# Patient Record
Sex: MALE | Race: BLACK OR AFRICAN AMERICAN | Employment: OTHER | URBAN - METROPOLITAN AREA
[De-identification: names, ages, dates, MRNs, and addresses within clinical notes are randomized per-mention and may not be internally consistent; named-entity substitution may affect disease eponyms.]

---
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
## 2021-03-26 ENCOUNTER — HOSPITAL ENCOUNTER (EMERGENCY)
Age: 21
Discharge: HOME | End: 2021-03-26

## 2021-03-26 VITALS
WEIGHT: 215 LBS | HEART RATE: 59 BPM | SYSTOLIC BLOOD PRESSURE: 122 MMHG | BODY MASS INDEX: 34.55 KG/M2 | DIASTOLIC BLOOD PRESSURE: 77 MMHG | TEMPERATURE: 97.9 F | OXYGEN SATURATION: 99 % | HEIGHT: 66 IN | RESPIRATION RATE: 16 BRPM

## 2021-03-26 DIAGNOSIS — K14.8 TONGUE LESION: ICD-10-CM

## 2021-03-26 DIAGNOSIS — R09.81 NASAL CONGESTION: Primary | ICD-10-CM

## 2021-03-26 PROCEDURE — 99284 EMERGENCY DEPT VISIT MOD MDM: CPT

## 2021-03-26 RX ORDER — GUAIFENESIN, PSEUDOEPHEDRINE HYDROCHLORIDE 600; 60 MG/1; MG/1
1 TABLET, EXTENDED RELEASE ORAL EVERY 12 HOURS
Qty: 14 TABLET | Refills: 0 | Status: SHIPPED | OUTPATIENT
Start: 2021-03-26 | End: 2021-04-02

## 2021-03-26 ASSESSMENT — ENCOUNTER SYMPTOMS
ABDOMINAL PAIN: 0
SINUS PRESSURE: 0
COUGH: 0
VOICE CHANGE: 0
CONSTIPATION: 0
SHORTNESS OF BREATH: 0
SORE THROAT: 0
TROUBLE SWALLOWING: 0
NAUSEA: 0
SINUS PAIN: 0
FACIAL SWELLING: 0
DIARRHEA: 0
VOMITING: 0
RHINORRHEA: 0

## 2021-03-26 NOTE — ED PROVIDER NOTES
[address] [facility] ED  eMERGENCYdEPARTMENT eNCOUnter      Pt Name: Ortega Wallace  MRN: [id number]  [place] 2000of evaluation: 3/26/2021  Safia Bo PA-C    CHIEF COMPLAINT       Chief Complaint   Patient presents with    Nasal Congestion     pt c/o nasal congestion and spots on his tongue         HISTORY OF PRESENT ILLNESS  (Location/Symptom, Timing/Onset, Context/Setting, Quality, Duration, Modifying Factors, Severity.)   Ortega Wallace is a 22 y.o. male who presents to the emergency department with 1) nasal congestion for a few days. Patient denies shortness of breath, fever, headache, ear pain, sore throat, abdominal pain, nausea, vomiting, diarrhea loss of taste, loss of smell. No one is sick at home. Patient reports having a Covid 19 test last week (negative)  2) darkened spots on his tongue for a few weeks. Patient denies painful lesions. Patient denies noticing these spots on his tongue prior to a few weeks ago. Patient is a non-smoker does not use tobacco products. Patient does smoke occasional THC. Patient notes no difficulty swallowing or breathing. He denies taking any products like Pepto-Bismol or medications for any reflux. Pepcid was listed as medication for this patient but he states he has not taken this medication for over a year. No history of cold sores. The history is provided by the patient. Nursing Notes were reviewed and I agree. REVIEW OF SYSTEMS    (2-9 systems for level 4, 10 or more for level 5)     Review of Systems   Constitutional: Negative for chills and fever. HENT: Positive for congestion. Negative for drooling, ear pain, facial swelling, rhinorrhea, sinus pressure, sinus pain, sneezing, sore throat, tinnitus, trouble swallowing and voice change. Respiratory: Negative for cough and shortness of breath. Gastrointestinal: Negative for abdominal pain, constipation, diarrhea, nausea and vomiting. Skin: Negative for rash. Neurological: Negative for headaches. as noted above the remainder of the review of systems was reviewed and negative. PAST MEDICAL HISTORY   History reviewed. No pertinent past medical history. SURGICAL HISTORY     History reviewed. No pertinent surgical history. CURRENT MEDICATIONS       Discharge Medication List as of 3/26/2021 10:09 AM      CONTINUE these medications which have NOT CHANGED    Details   naproxen (NAPROSYN) 500 MG tablet Take 1 tablet by mouth 2 times daily for 20 doses, Disp-20 tablet, R-0Print             ALLERGIES     Patient has no known allergies. HISTORY     History reviewed. No pertinent family history.        SOCIAL HISTORY       Social History     Socioeconomic History    Marital status: Single     Spouse name: None    Number of children: None    Years of education: None    Highest education level: None   Occupational History    None   Social Needs    Financial resource strain: None    Food insecurity     Worry: None     Inability: None    Transportation needs     Medical: None     Non-medical: None   Tobacco Use    Smoking status: Current Some Day Smoker     Types: Cigars    Smokeless tobacco: Never Used   Substance and Sexual Activity    Alcohol use: No    Drug use: Yes     Types: Marijuana    Sexual activity: None   Lifestyle    Physical activity     Days per week: None     Minutes per session: None    Stress: None   Relationships    Social connections     Talks on phone: None     Gets together: None     Attends Yazidism service: None     Active member of club or organization: None     Attends meetings of clubs or organizations: None     Relationship status: None    Intimate partner violence     Fear of current or ex partner: None     Emotionally abused: None     Physically abused: None     Forced sexual activity: None   Other Topics Concern    None   Social History Narrative    None       SCREENINGS    Dante Coma Scale  Eye Opening: Spontaneous  Best Verbal Response: Oriented  Best Motor Response: Obeys commands  [place] Coma Scale Score: 15      PHYSICAL EXAM    (up to 7 forlevel 4, 8 or more for level 5)     ED Triage Vitals [03/26/21 0946]   BP Temp Temp Source Pulse Resp SpO2 Height Weight   122/77 97.9 °F (36.6 °C) Oral 59 16 99 % 5' 6\" (1.676 m) 215 lb (97.5 kg)       Physical Exam  Vitals signs and nursing note reviewed. Constitutional:       General: He is not in acute distress. Appearance: He is well-developed. He is not toxic-appearing. HENT:      Head: Normocephalic and atraumatic. Right Ear: Tympanic membrane, ear canal and external ear normal.      Left Ear: Tympanic membrane, ear canal and external ear normal.      Nose: Congestion (Left-sided, mild) present. Right Sinus: No maxillary sinus tenderness or frontal sinus tenderness. Left Sinus: No maxillary sinus tenderness or frontal sinus tenderness. Mouth/Throat:      Lips: Pink. No lesions. Mouth: Mucous membranes are moist. No oral lesions. Dentition: No gum lesions. Tongue: Lesions present. Palate: No mass and lesions. Pharynx: Uvula midline. No pharyngeal swelling, oropharyngeal exudate, posterior oropharyngeal erythema or uvula swelling. Tonsils: No tonsillar exudate or tonsillar abscesses. Comments: Multiple hyperpigmented freckles on lower lip and inner aspects of lower lip and gums. (Patient reports these to be baseline from birth) No other intraoral lesions noted. Eyes:      Conjunctiva/sclera: Conjunctivae normal.      Pupils: Pupils are equal, round, and reactive to light. Neck:      Musculoskeletal: Normal range of motion and neck supple. Thyroid: No thyromegaly. Trachea: Trachea and phonation normal.   Cardiovascular:      Rate and Rhythm: Normal rate and regular rhythm. Heart sounds: Normal heart sounds. No murmur.    Pulmonary:      Effort: Pulmonary effort is normal. No respiratory distress. Breath sounds: Normal breath sounds. No wheezing, rhonchi or rales. Lymphadenopathy:      Head:      Right side of head: No submental, submandibular or tonsillar adenopathy. Left side of head: No submental, submandibular or tonsillar adenopathy. Cervical: No cervical adenopathy. Skin:     General: Skin is warm and dry. Findings: No rash. Neurological:      Mental Status: He is alert and oriented to person, place, and time. He is not disoriented. Psychiatric:         Speech: Speech normal.         Behavior: Behavior normal.         Thought Content: Thought content normal.         Judgment: Judgment normal.           DIAGNOSTIC RESULTS     RADIOLOGY:   Non-plain film images such as CT, Ultrasound and MRI are read by the radiologist. Plain radiographic images are visualized and preliminarilyinterpreted by Naomi Pagan PA-C with the below findings:        Interpretation per the Radiologist below, if available at the time of this note:    No orders to display       LABS:  Labs Reviewed - No data to display    All other labs were within normal range or not returnedas of this dictation. EMERGENCYDEPARTMENT COURSE and DIFFERENTIAL DIAGNOSIS/MDM:   Vitals:    Vitals:    03/26/21 0946   BP: 122/77   Pulse: 59   Resp: 16   Temp: 97.9 °F (36.6 °C)   TempSrc: Oral   SpO2: 99%   Weight: 215 lb (97.5 kg)   Height: 5' 6\" (1.676 m)           MDM    1) mild left-sided head congestion: Patient counseled that his infection appears to be of a viral etiology at this time. Symptomatic treatments discussed. Patient counseled to return here or go to the Emergency department if his symptoms change, worsen or do not improve in 1 week. I offered a COVID-19 swab but patient declined stating he had a negative test last week. 2) tongue lesions: Patient reports newly developing hyperpigmented tongue lesions x3 on his anterior supraglottal region of tongue.   He denies use of products such as Pepto that might cause discoloration. These lesions are neither tender nor friable. He has similar appearing hyperpigmented freckle spots on his lower inner lip and gums, but states the tongue spots are new. For this reason, cancerous lesions cannot fully be excluded so I will refer him to an ear nose and throat specialist for further evaluation. Additionally I counseled him to stop THC. PROCEDURES:    Procedures      FINAL IMPRESSION      1. Nasal congestion    2.  Tongue lesion          DISPOSITION/PLAN   DISPOSITION Decision To Discharge 03/26/2021 10:02:36 AM      PATIENT REFERRED TO:  Ana Rosa Wing,   [address] [facility]  [address]  [address] [facility] [phone]      As needed    Patricia Pastrana MD  [address] [place] [address] [place] [address]  [phone]            DISCHARGE MEDICATIONS:  Discharge Medication List as of 3/26/2021 10:09 AM      START taking these medications    Details   pseudoephedrine-guaiFENesin (MUCINEX D)  MG per extended release tablet Take 1 tablet by mouth every 12 hours for 7 days, Disp-14 tablet, R-0Print             (Please note thatportions of this note were completed with a voice recognition program.  Efforts were made to edit the dictations but occasionally words are mis-transcribed.)    Romualdo Curling, PA-C Romualdo Curling, PA-C  03/26/21 [id number]

## 2021-03-26 NOTE — ED TRIAGE NOTES
Pt c/o nasal congestion and three spots on his tongue, Pt is A&OX3, calm, ambulatory, afebrile, breathes are equal and unlabored, lung sounds clear, Pt has three dark areas on his tongue, no redness or edema noted.

## 2021-06-16 ENCOUNTER — HOSPITAL ENCOUNTER (EMERGENCY)
Age: 21
Discharge: HOME | End: 2021-06-16

## 2021-06-16 VITALS
HEART RATE: 62 BPM | SYSTOLIC BLOOD PRESSURE: 107 MMHG | WEIGHT: 220 LBS | OXYGEN SATURATION: 100 % | TEMPERATURE: 98 F | RESPIRATION RATE: 16 BRPM | DIASTOLIC BLOOD PRESSURE: 60 MMHG | HEIGHT: 66 IN | BODY MASS INDEX: 35.36 KG/M2

## 2021-06-16 DIAGNOSIS — Z71.1 CONCERN ABOUT STD IN MALE WITHOUT DIAGNOSIS: Primary | ICD-10-CM

## 2021-06-16 LAB
BILIRUBIN URINE: NEGATIVE
BLOOD, URINE: NEGATIVE
CLARITY: ABNORMAL
COLOR: YELLOW
GLUCOSE URINE: NEGATIVE MG/DL
KETONES, URINE: ABNORMAL MG/DL
LEUKOCYTE ESTERASE, URINE: NEGATIVE
NITRITE, URINE: NEGATIVE
PH UA: 8 (ref 5–9)
PROTEIN UA: NEGATIVE MG/DL
SPECIFIC GRAVITY UA: 1.02 (ref 1–1.03)
URINE REFLEX TO CULTURE: ABNORMAL
UROBILINOGEN, URINE: 1 E.U./DL

## 2021-06-16 PROCEDURE — 81003 URINALYSIS AUTO W/O SCOPE: CPT

## 2021-06-16 PROCEDURE — 87491 CHLMYD TRACH DNA AMP PROBE: CPT

## 2021-06-16 PROCEDURE — 6360000002 HC RX W HCPCS: Performed by: NURSE PRACTITIONER

## 2021-06-16 PROCEDURE — 6370000000 HC RX 637 (ALT 250 FOR IP): Performed by: NURSE PRACTITIONER

## 2021-06-16 PROCEDURE — 87591 N.GONORRHOEAE DNA AMP PROB: CPT

## 2021-06-16 PROCEDURE — 87661 TRICHOMONAS VAGINALIS AMPLIF: CPT

## 2021-06-16 PROCEDURE — 96372 THER/PROPH/DIAG INJ SC/IM: CPT

## 2021-06-16 PROCEDURE — 99283 EMERGENCY DEPT VISIT LOW MDM: CPT

## 2021-06-16 RX ORDER — AZITHROMYCIN 250 MG/1
1000 TABLET, FILM COATED ORAL ONCE
Status: COMPLETED | OUTPATIENT
Start: 2021-06-16 | End: 2021-06-16

## 2021-06-16 RX ORDER — CEFTRIAXONE 500 MG/1
500 INJECTION, POWDER, FOR SOLUTION INTRAMUSCULAR; INTRAVENOUS ONCE
Status: COMPLETED | OUTPATIENT
Start: 2021-06-16 | End: 2021-06-16

## 2021-06-16 RX ORDER — METRONIDAZOLE 500 MG/1
2000 TABLET ORAL ONCE
Status: COMPLETED | OUTPATIENT
Start: 2021-06-16 | End: 2021-06-16

## 2021-06-16 RX ADMIN — CEFTRIAXONE SODIUM 500 MG: 500 INJECTION, POWDER, FOR SOLUTION INTRAMUSCULAR; INTRAVENOUS at 09:52

## 2021-06-16 RX ADMIN — AZITHROMYCIN MONOHYDRATE 1000 MG: 250 TABLET ORAL at 09:51

## 2021-06-16 RX ADMIN — METRONIDAZOLE 2000 MG: 500 TABLET ORAL at 09:50

## 2021-06-16 ASSESSMENT — ENCOUNTER SYMPTOMS
ABDOMINAL PAIN: 0
COUGH: 0
SHORTNESS OF BREATH: 0
BACK PAIN: 0

## 2021-06-16 NOTE — ED PROVIDER NOTES
[address] [facility] ED  eMERGENCY dEPARTMENT eNCOUnter      Pt Name: Eliazar Stein  MRN: [id number]  [place] 2000  Date of evaluation: 6/16/2021  Provider: ANURAG Zavala CNP      HISTORY OF PRESENT ILLNESS    Eliazar Stein is a 22 y.o. male who presents to the Emergency Department with dysuria and frequency x 2 days. Patient is sexually active. He denies abdominal pain, nausea or vomiting. Penile D/C or testicular pain. No pain. REVIEW OF SYSTEMS       Review of Systems   Constitutional: Negative for fever. HENT: Negative for congestion. Respiratory: Negative for cough and shortness of breath. Cardiovascular: Negative for chest pain. Gastrointestinal: Negative for abdominal pain. Genitourinary: Positive for dysuria and frequency. Negative for difficulty urinating, discharge, flank pain, genital sores, hematuria, penile pain, penile swelling, scrotal swelling, testicular pain and urgency. Musculoskeletal: Negative for arthralgias and back pain. Skin: Negative for rash. All other systems reviewed and are negative. PAST MEDICAL HISTORY   History reviewed. No pertinent past medical history. SURGICAL HISTORY     History reviewed. No pertinent surgical history. CURRENT MEDICATIONS       Previous Medications    NAPROXEN (NAPROSYN) 500 MG TABLET    Take 1 tablet by mouth 2 times daily for 20 doses       ALLERGIES     Patient has no known allergies. FAMILY HISTORY     History reviewed. No pertinent family history.        SOCIAL HISTORY       Social History     Socioeconomic History    Marital status: Single     Spouse name: None    Number of children: None    Years of education: None    Highest education level: None   Occupational History    None   Tobacco Use    Smoking status: Current Some Day Smoker     Types: Cigars    Smokeless tobacco: Never Used   Vaping Use    Vaping Use: Never used   Substance and Sexual Activity    Alcohol use: No    Drug use: Yes     Types: Marijuana    Sexual activity: None   Other Topics Concern    None   Social History Narrative    None     Social Determinants of Health     Financial Resource Strain:     Difficulty of Paying Living Expenses:    Food Insecurity:     Worried About Running Out of Food in the Last Year:     [address] in the Last Year:    Transportation Needs:     Lack of Transportation (Medical):  Lack of Transportation (Non-Medical):    Physical Activity:     Days of Exercise per Week:     Minutes of Exercise per Session:    Stress:     Feeling of Stress :    Social Connections:     Frequency of Communication with Friends and Family:     Frequency of Social Gatherings with Friends and Family:     Attends Jewish Services:     Active Member of Clubs or Organizations:     Attends Club or Organization Meetings:     Marital Status:    Intimate Partner Violence:     Fear of Current or Ex-Partner:     Emotionally Abused:     Physically Abused:     Sexually Abused:        SCREENINGS      @FLOW([id number])@      PHYSICAL EXAM    (up to 7 for level 4, 8 or more for level 5)     ED Triage Vitals   BP Temp Temp Source Pulse Resp SpO2 Height Weight   06/16/21 0906 06/16/21 0905 06/16/21 0905 06/16/21 0906 06/16/21 0905 06/16/21 0906 06/16/21 0905 06/16/21 0905   107/60 98 °F (36.7 °C) Oral 62 16 100 % 5' 6\" (1.676 m) 220 lb (99.8 kg)       Physical Exam  Vitals and nursing note reviewed. Constitutional:       Appearance: He is well-developed. HENT:      Head: Normocephalic and atraumatic. Right Ear: External ear normal.      Left Ear: External ear normal.   Eyes:      Conjunctiva/sclera: Conjunctivae normal.      Pupils: Pupils are equal, round, and reactive to light. Cardiovascular:      Rate and Rhythm: Normal rate and regular rhythm. Pulmonary:      Effort: Pulmonary effort is normal.      Breath sounds: Normal breath sounds.    Abdominal:      General: Bowel sounds are normal. There is no distension. Palpations: Abdomen is soft. Tenderness: There is no abdominal tenderness. Genitourinary:      Musculoskeletal:         General: Normal range of motion. Cervical back: Normal range of motion and neck supple. Skin:     General: Skin is warm and dry. Neurological:      Mental Status: He is alert and oriented to person, place, and time. Deep Tendon Reflexes: Reflexes are normal and symmetric. Psychiatric:         Judgment: Judgment normal.           All other labs were within normal range or not returned as of this dictation. EMERGENCY DEPARTMENT COURSE and DIFFERENTIALDIAGNOSIS/MDM:   Vitals:    Vitals:    06/16/21 0905 06/16/21 0906   BP:  107/60   Pulse:  62   Resp: 16    Temp: 98 °F (36.7 °C)    TempSrc: Oral    SpO2:  100%   Weight: 220 lb (99.8 kg)    Height: 5' 6\" (1.676 m)             22 yr old male with concern for STD. Patient will F/U With PCP in 3 days for culture results. UA was negative. Patient is comfortable in the room and verbalizes understanding. PROCEDURES:  Unless otherwise noted below, none     Procedures      FINAL IMPRESSION      1.  Concern about STD in male without diagnosis          DISPOSITION/PLAN   DISPOSITION Decision To Discharge 06/16/2021 10:24:51 AM          ANURAG Braun CNP (electronically signed)  Attending Emergency Physician     ANURAG Braun CNP  06/16/21 1026

## 2021-06-16 NOTE — ED TRIAGE NOTES
Patient presents to the er with complaints of frequent urination  States that when he feels like he has to go pee, he starts to and a little comes out, then 10 mins later he has to pee again  Some irritation with urinating  Denies penile discharge  States urine is dark yellow

## 2021-06-18 LAB
SPECIMEN SOURCE: NORMAL
T. VAGINALIS AMPLIFIED: NEGATIVE

## 2021-06-22 LAB
C. TRACHOMATIS DNA ,URINE: NEGATIVE
N. GONORRHOEAE DNA, URINE: NEGATIVE

## 2021-11-11 ENCOUNTER — HOSPITAL ENCOUNTER (EMERGENCY)
Age: 21
Discharge: HOME | End: 2021-11-11
Attending: EMERGENCY MEDICINE
Payer: MEDICAID

## 2021-11-11 DIAGNOSIS — J01.00 ACUTE NON-RECURRENT MAXILLARY SINUSITIS: Primary | ICD-10-CM

## 2021-11-11 PROCEDURE — 99282 EMERGENCY DEPT VISIT SF MDM: CPT

## 2021-11-11 RX ORDER — AZITHROMYCIN 250 MG/1
TABLET, FILM COATED ORAL
Qty: 1 PACKET | Refills: 0 | Status: SHIPPED | OUTPATIENT
Start: 2021-11-11 | End: 2021-11-15

## 2021-11-11 ASSESSMENT — ENCOUNTER SYMPTOMS
SHORTNESS OF BREATH: 0
BACK PAIN: 0
ABDOMINAL PAIN: 0
SORE THROAT: 0
NAUSEA: 0
RHINORRHEA: 1
SINUS PRESSURE: 1
COUGH: 1
DIARRHEA: 0
VOMITING: 0

## 2021-11-11 NOTE — ED PROVIDER NOTES
[address] [facility] ED  eMERGENCYdEPARTMENT eNCOUnter      Pt Name: Alfred Alejo  MRN: [id number]  [place]aud[place] 2000  Date of evaluation: 11/11/2021  Maldonado Chirinos MD    CHIEF COMPLAINT           HPI  Alfred Alejo is a 22 y.o. male per chart review has no pmh presents to the ED with rhinorrhea, sinus congestion x 10 days. Pt states he woke up today and coughed up blood. Pt states it feels the blood came from his sinuses. Pt denies fever, n/v, cp, sob, ab pain, dysuria, diarrhea. ROS  Review of Systems   Constitutional: Negative for activity change, chills and fever. HENT: Positive for rhinorrhea and sinus pressure. Negative for ear pain and sore throat. Eyes: Negative for visual disturbance. Respiratory: Positive for cough. Negative for shortness of breath. Hemoptysis   Cardiovascular: Negative for chest pain, palpitations and leg swelling. Gastrointestinal: Negative for abdominal pain, diarrhea, nausea and vomiting. Genitourinary: Negative for dysuria. Musculoskeletal: Negative for back pain. Skin: Negative for rash. Neurological: Negative for dizziness and weakness. Except as noted above the remainder of the review of systems was reviewed and negative. PAST MEDICAL HISTORY   No past medical history on file. SURGICAL HISTORY     No past surgical history on file. CURRENTMEDICATIONS       Previous Medications    NAPROXEN (NAPROSYN) 500 MG TABLET    Take 1 tablet by mouth 2 times daily for 20 doses       ALLERGIES     Patient has no known allergies. FAMILY HISTORY     No family history on file.        SOCIAL HISTORY       Social History     Socioeconomic History    Marital status: Single     Spouse name: Not on file    Number of children: Not on file    Years of education: Not on file    Highest education level: Not on file   Occupational History    Not on file   Tobacco Use    Smoking status: Current Some Day Smoker     Types: Cigars    Smokeless tobacco: Never Used   Vaping Use    Vaping Use: Never used   Substance and Sexual Activity    Alcohol use: No    Drug use: Yes     Types: Marijuana Garo Mustard)    Sexual activity: Not on file   Other Topics Concern    Not on file   Social History Narrative    Not on file     Social Determinants of Health     Financial Resource Strain:     Difficulty of Paying Living Expenses: Not on file   Food Insecurity:     Worried About Running Out of Food in the Last Year: Not on file    [place] of Food in the Last Year: Not on file   Transportation Needs:     Lack of Transportation (Medical): Not on file    Lack of Transportation (Non-Medical): Not on file   Physical Activity:     Days of Exercise per Week: Not on file    Minutes of Exercise per Session: Not on file   Stress:     Feeling of Stress : Not on file   Social Connections:     Frequency of Communication with Friends and Family: Not on file    Frequency of Social Gatherings with Friends and Family: Not on file    Attends [employer] Services: Not on file    Active Member of [address] [place] [address] [employer] or Organizations: Not on file    Attends Club or Organization Meetings: Not on file    Marital Status: Not on file   Intimate Partner Violence:     Fear of Current or Ex-Partner: Not on file    Emotionally Abused: Not on file    Physically Abused: Not on file    Sexually Abused: Not on file   Housing Stability:     Unable to Pay for Housing in the Last Year: Not on file    Number of [place] in the Last Year: Not on file    Unstable Housing in the Last Year: Not on file         PHYSICAL EXAM       ED Triage Vitals   BP Temp Temp src Pulse Resp SpO2 Height Weight   -- -- -- -- -- -- -- --       Physical Exam  Vitals and nursing note reviewed. Constitutional:       Appearance: He is well-developed. HENT:      Head: Normocephalic.       Comments: +Maxillary sinus pressure     Right Ear: External ear normal.      Left Ear: External ear normal.      Ears: Comments: L TM opaque, poor landmarks. R TM opaque, poor landmarks. Eyes:      Conjunctiva/sclera: Conjunctivae normal.      Pupils: Pupils are equal, round, and reactive to light. Cardiovascular:      Rate and Rhythm: Normal rate and regular rhythm. Heart sounds: Normal heart sounds. Pulmonary:      Effort: Pulmonary effort is normal.      Breath sounds: Normal breath sounds. Abdominal:      General: Bowel sounds are normal. There is no distension. Palpations: Abdomen is soft. Tenderness: There is no abdominal tenderness. Musculoskeletal:         General: Normal range of motion. Cervical back: Normal range of motion and neck supple. Skin:     General: Skin is warm and dry. Neurological:      Mental Status: He is alert and oriented to person, place, and time. Psychiatric:         Mood and Affect: Mood normal.           MDM  23 yo male presents to the ED with rhinorrhea, cough, sinus pressure. Pt is afebrile, hemodynamically stable. Pt states he coughed up blood but feels like it came from his sinuses. Pt likely with sinusitis. Given longevity of symptoms, will treat pt with abx. Pt given prescription for azithromycin, given sinusitis warning signs and will f/u with pcp. Pt understands plan. FINAL IMPRESSION      1.  Acute non-recurrent maxillary sinusitis          DISPOSITION/PLAN   DISPOSITION          DISCHARGE MEDICATIONS:  [unfilled]         Carlos Lennon MD(electronically signed)  Attending Emergency Physician            Carlos Lennon MD  11/11/21 8190

## 2021-11-11 NOTE — ED TRIAGE NOTES
Pt a/o x 3 skin pink w/d resp non labored. Pt c/o coughijng up blood.  Assumed d/c of pt per Dr Ml Nuno request

## 2021-11-13 PROCEDURE — 99284 EMERGENCY DEPT VISIT MOD MDM: CPT

## 2021-11-14 ENCOUNTER — HOSPITAL ENCOUNTER (EMERGENCY)
Age: 21
Discharge: HOME | End: 2021-11-14
Attending: EMERGENCY MEDICINE
Payer: MEDICAID

## 2021-11-14 ENCOUNTER — APPOINTMENT (OUTPATIENT)
Dept: GENERAL RADIOLOGY | Age: 21
End: 2021-11-14
Payer: MEDICAID

## 2021-11-14 VITALS
WEIGHT: 220 LBS | HEIGHT: 65 IN | RESPIRATION RATE: 18 BRPM | SYSTOLIC BLOOD PRESSURE: 111 MMHG | DIASTOLIC BLOOD PRESSURE: 78 MMHG | OXYGEN SATURATION: 99 % | HEART RATE: 95 BPM | BODY MASS INDEX: 36.65 KG/M2 | TEMPERATURE: 98.2 F

## 2021-11-14 DIAGNOSIS — R11.0 NAUSEA: Primary | ICD-10-CM

## 2021-11-14 PROCEDURE — 71045 X-RAY EXAM CHEST 1 VIEW: CPT

## 2021-11-14 PROCEDURE — 6370000000 HC RX 637 (ALT 250 FOR IP): Performed by: EMERGENCY MEDICINE

## 2021-11-14 RX ORDER — ONDANSETRON 4 MG/1
4 TABLET, ORALLY DISINTEGRATING ORAL ONCE
Status: COMPLETED | OUTPATIENT
Start: 2021-11-14 | End: 2021-11-14

## 2021-11-14 RX ORDER — ONDANSETRON 4 MG/1
4 TABLET, ORALLY DISINTEGRATING ORAL 3 TIMES DAILY PRN
Qty: 21 TABLET | Refills: 0 | Status: ON HOLD | OUTPATIENT
Start: 2021-11-14 | End: 2021-11-19

## 2021-11-14 RX ADMIN — ONDANSETRON 4 MG: 4 TABLET, ORALLY DISINTEGRATING ORAL at 01:58

## 2021-11-14 ASSESSMENT — ENCOUNTER SYMPTOMS
NAUSEA: 1
COUGH: 1

## 2021-11-14 NOTE — ED NOTES
Patient appears more comfortable, reports improvement in nausea, tolerated apple juice, now drinking ginger ale and eating pudding, will prepare for discharge     Nubia Pinto RN  11/14/21 6666

## 2021-11-14 NOTE — ED PROVIDER NOTES
[address] [facility] ED  EMERGENCY MEDICINE     Pt Name: Mejia Velasquez  MRN: [id number]  [place] 2000  Date of evaluation: 11/13/2021  PCP:    Pina Santana DO  Provider: Betzaida Lyon, [address] [place] [address]       Chief Complaint   Patient presents with    Dehydration     mouth is dry       HISTORY OF PRESENT ILLNESS    HPI     23-year-old male no past medical history presents to the emergency department with nausea. He states that he has had a cough for the past week and was seen here a few days ago and was given azithromycin for possible sinusitis. He states that the medicine is upsetting his stomach. States that he is coughing so hard that he is vomiting. Denies any abdominal pain or shortness of breath. Denies any fevers or chills. Triage notes and Nursing notes were reviewed by myself. Any discrepancies are addressed above. PAST MEDICAL HISTORY   History reviewed. No pertinent past medical history. SURGICAL HISTORY     History reviewed. No pertinent surgical history. CURRENT MEDICATIONS       Previous Medications    AZITHROMYCIN (ZITHROMAX Z-PHAN) 250 MG TABLET    Take 2 tablets (500 mg) on Day 1, and then take 1 tablet (250 mg) on days 2 through 5. NAPROXEN (NAPROSYN) 500 MG TABLET    Take 1 tablet by mouth 2 times daily for 20 doses       ALLERGIES     No Known Allergies    FAMILY HISTORY     History reviewed. No pertinent family history.      SOCIAL HISTORY       Social History     Socioeconomic History    Marital status: Single     Spouse name: None    Number of children: None    Years of education: None    Highest education level: None   Occupational History    None   Tobacco Use    Smoking status: Current Some Day Smoker     Types: Cigars    Smokeless tobacco: Never Used   Vaping Use    Vaping Use: Never used   Substance and Sexual Activity    Alcohol use: No    Drug use: Yes     Types: Marijuana Kenji Duglas)    Sexual activity: None   Other Topics Concern    None Social History Narrative    None     Social Determinants of Health     Financial Resource Strain:     Difficulty of Paying Living Expenses: Not on file   Food Insecurity:     Worried About Running Out of Food in the Last Year: Not on file    [place] of Food in the Last Year: Not on file   Transportation Needs:     Lack of Transportation (Medical): Not on file    Lack of Transportation (Non-Medical): Not on file   Physical Activity:     Days of Exercise per Week: Not on file    Minutes of Exercise per Session: Not on file   Stress:     Feeling of Stress : Not on file   Social Connections:     Frequency of Communication with Friends and Family: Not on file    Frequency of Social Gatherings with Friends and Family: Not on file    Attends [employer] Services: Not on file    Active Member of [address] [place] [address] [employer] or Organizations: Not on file    Attends Club or Organization Meetings: Not on file    Marital Status: Not on file   Intimate Partner Violence:     Fear of Current or Ex-Partner: Not on file    Emotionally Abused: Not on file    Physically Abused: Not on file    Sexually Abused: Not on file   Housing Stability:     Unable to Pay for Housing in the Last Year: Not on file    Number of [place] in the Last Year: Not on file    Unstable Housing in the Last Year: Not on file       REVIEW OF SYSTEMS     Review of Systems   Respiratory: Positive for cough. Gastrointestinal: Positive for nausea. Except as noted above the remainder of the review of systems was reviewed and is negative. SCREENINGS                        PHYSICAL EXAM    (up to 7 for level 4, 8 or more for level 5)     ED Triage Vitals [11/13/21 2159]   BP Temp Temp Source Pulse Resp SpO2 Height Weight   103/67 98.2 °F (36.8 °C) Oral 105 18 98 % 5' 5\" (1.651 m) 220 lb (99.8 kg)       Physical Exam  Constitutional:       Appearance: Normal appearance. He is normal weight. He is not ill-appearing or toxic-appearing.    HENT:      Head: Normocephalic and atraumatic. Nose: Nose normal. No congestion or rhinorrhea. Mouth/Throat:      Mouth: Mucous membranes are moist.   Eyes:      General:         Right eye: No discharge. Left eye: No discharge. Conjunctiva/sclera: Conjunctivae normal.      Pupils: Pupils are equal, round, and reactive to light. Cardiovascular:      Rate and Rhythm: Normal rate. Heart sounds: No murmur heard. Pulmonary:      Effort: Pulmonary effort is normal. No respiratory distress. Breath sounds: Normal breath sounds. No wheezing. Chest:      Chest wall: No tenderness. Abdominal:      General: Abdomen is flat. There is no distension. Palpations: Abdomen is soft. Tenderness: There is no abdominal tenderness. Musculoskeletal:         General: No swelling. Normal range of motion. Cervical back: Normal range of motion and neck supple. Skin:     General: Skin is warm and dry. Capillary Refill: Capillary refill takes less than 2 seconds. Neurological:      General: No focal deficit present. Mental Status: He is alert and oriented to person, place, and time. Psychiatric:         Mood and Affect: Mood normal.         Behavior: Behavior normal.         Thought Content: Thought content normal.         Judgment: Judgment normal.           DIAGNOSTIC RESULTS     EKG:(none if blank)  All EKGs are interpreted by the Emergency Department Physician who either signs or Co-signs this chart in the absence of a cardiologist.        RADIOLOGY: (none if blank)   I directly visualized the following images and reviewed the radiologist interpretations. Interpretation per the Radiologist below, if available at the time of this note:  XR CHEST PORTABLE    (Results Pending)       LABS:  Labs Reviewed - No data to display    All other labs were within normal range or not returned as of this dictation.   Please note, any cultures that may have been sent were not resulted at the time of this patient visit. EMERGENCY DEPARTMENT COURSE and Medical Decision Making:     Vitals:    Vitals:    11/13/21 2159   BP: 103/67   Pulse: 105   Resp: 18   Temp: 98.2 °F (36.8 °C)   TempSrc: Oral   SpO2: 98%   Weight: 220 lb (99.8 kg)   Height: 5' 5\" (1.651 m)       PROCEDURES: (None if blank)  Procedures       MDM    Patient given Zofran. Was able to tolerate p.o. challenge with apple juice. Was told to stop the azithromycin as this may be upsetting his stomach. Was told to stay on a simple diet until he does start feeling better. Will be discharged in stable condition. Patient has moist mucous membranes and normal vitals and does not appear to be dehydrated. Strict return precautions and follow up instructions were discussed with the patient with which the patient agrees    ED Medications administered this visit:    Medications   ondansetron (ZOFRAN-ODT) disintegrating tablet 4 mg (4 mg Oral Given 11/14/21 0158)         FINAL IMPRESSION      1.  Nausea          DISPOSITION/PLAN   DISPOSITION Decision To Discharge 11/14/2021 02:54:45 AM      PATIENT REFERRED TO:  Maya Hernandez DO  [address] [place] [address]  [phone] [address]  [phone]            DISCHARGE MEDICATIONS:  New Prescriptions    ONDANSETRON (ZOFRAN-ODT) 4 MG DISINTEGRATING TABLET    Take 1 tablet by mouth 3 times daily as needed for Nausea or Vomiting              Jose R Nina DO (electronically signed)  Attending Physician, Emergency Department         Jose R Nina [address] [place] [address]  11/14/21 5239

## 2021-11-14 NOTE — ED TRIAGE NOTES
Pt a/o x 3 skin pink w/d resp non labored. Pt reports he thinks he is dehydrated and his mouth is dry. Pt seen here 4-5 days ago dx sinus infection. lungs clear

## 2021-11-19 ENCOUNTER — HOSPITAL ENCOUNTER (INPATIENT)
Age: 21
LOS: 3 days | Discharge: HOME | DRG: 137 | End: 2021-11-22
Attending: EMERGENCY MEDICINE | Admitting: INTERNAL MEDICINE
Payer: MEDICAID

## 2021-11-19 ENCOUNTER — APPOINTMENT (OUTPATIENT)
Dept: CT IMAGING | Age: 21
DRG: 137 | End: 2021-11-19
Payer: MEDICAID

## 2021-11-19 DIAGNOSIS — J18.9 PNEUMONIA DUE TO INFECTIOUS ORGANISM, UNSPECIFIED LATERALITY, UNSPECIFIED PART OF LUNG: Primary | ICD-10-CM

## 2021-11-19 DIAGNOSIS — I26.99 ACUTE PULMONARY EMBOLISM, UNSPECIFIED PULMONARY EMBOLISM TYPE, UNSPECIFIED WHETHER ACUTE COR PULMONALE PRESENT (HCC): ICD-10-CM

## 2021-11-19 LAB
ADENOVIRUS BY PCR: NOT DETECTED
ALBUMIN SERPL-MCNC: 4 G/DL (ref 3.5–4.6)
ALP BLD-CCNC: 61 U/L (ref 35–104)
ALT SERPL-CCNC: 39 U/L (ref 0–41)
ANION GAP SERPL CALCULATED.3IONS-SCNC: 15 MEQ/L (ref 9–15)
APTT: 34.9 SEC (ref 24.4–36.8)
AST SERPL-CCNC: 28 U/L (ref 0–40)
BASOPHILS ABSOLUTE: 0.2 K/UL (ref 0–0.2)
BASOPHILS RELATIVE PERCENT: 1.3 %
BILIRUB SERPL-MCNC: 1.1 MG/DL (ref 0.2–0.7)
BORDETELLA PARAPERTUSSIS BY PCR: NOT DETECTED
BORDETELLA PERTUSSIS BY PCR: NOT DETECTED
BUN BLDV-MCNC: 6 MG/DL (ref 6–20)
CALCIUM SERPL-MCNC: 9.4 MG/DL (ref 8.5–9.9)
CHLAMYDOPHILIA PNEUMONIAE BY PCR: NOT DETECTED
CHLORIDE BLD-SCNC: 99 MEQ/L (ref 95–107)
CO2: 25 MEQ/L (ref 20–31)
CORONAVIRUS 229E BY PCR: NOT DETECTED
CORONAVIRUS HKU1 BY PCR: NOT DETECTED
CORONAVIRUS NL63 BY PCR: NOT DETECTED
CORONAVIRUS OC43 BY PCR: NOT DETECTED
CREAT SERPL-MCNC: 1.03 MG/DL (ref 0.7–1.2)
EOSINOPHILS ABSOLUTE: 0 K/UL (ref 0–0.7)
EOSINOPHILS RELATIVE PERCENT: 0.2 %
GFR AFRICAN AMERICAN: >60
GFR AFRICAN AMERICAN: >60
GFR NON-AFRICAN AMERICAN: >60
GFR NON-AFRICAN AMERICAN: >60
GLOBULIN: 4 G/DL (ref 2.3–3.5)
GLUCOSE BLD-MCNC: 126 MG/DL (ref 70–99)
HCT VFR BLD CALC: 40.8 % (ref 42–52)
HEMOGLOBIN: 12.8 G/DL (ref 14–18)
HUMAN METAPNEUMOVIRUS BY PCR: NOT DETECTED
HUMAN RHINOVIRUS/ENTEROVIRUS BY PCR: NOT DETECTED
INFLUENZA A BY PCR: NOT DETECTED
INFLUENZA B BY PCR: NOT DETECTED
INR BLD: 1.2
LACTATE DEHYDROGENASE: 336 U/L (ref 135–225)
LYMPHOCYTES ABSOLUTE: 0.8 K/UL (ref 1–4.8)
LYMPHOCYTES RELATIVE PERCENT: 6.6 %
MAGNESIUM: 2.5 MG/DL (ref 1.7–2.4)
MCH RBC QN AUTO: 24 PG (ref 27–31.3)
MCHC RBC AUTO-ENTMCNC: 31.4 % (ref 33–37)
MCV RBC AUTO: 76.3 FL (ref 80–100)
MONOCYTES ABSOLUTE: 1.5 K/UL (ref 0.2–0.8)
MONOCYTES RELATIVE PERCENT: 12.1 %
MYCOPLASMA PNEUMONIAE BY PCR: NOT DETECTED
NEUTROPHILS ABSOLUTE: 9.6 K/UL (ref 1.4–6.5)
NEUTROPHILS RELATIVE PERCENT: 79.8 %
PARAINFLUENZA VIRUS 1 BY PCR: NOT DETECTED
PARAINFLUENZA VIRUS 2 BY PCR: NOT DETECTED
PARAINFLUENZA VIRUS 3 BY PCR: NOT DETECTED
PARAINFLUENZA VIRUS 4 BY PCR: NOT DETECTED
PDW BLD-RTO: 13.7 % (ref 11.5–14.5)
PERFORMED ON: NORMAL
PLATELET # BLD: 374 K/UL (ref 130–400)
POC CREATININE WHOLE BLOOD: 1.1
POC CREATININE: 1.1 MG/DL (ref 0.9–1.3)
POC SAMPLE TYPE: NORMAL
POTASSIUM SERPL-SCNC: 3.4 MEQ/L (ref 3.4–4.9)
PROTHROMBIN TIME: 15.3 SEC (ref 12.3–14.9)
RBC # BLD: 5.34 M/UL (ref 4.7–6.1)
REASON FOR REJECTION: NORMAL
REJECTED TEST: NORMAL
RESPIRATORY SYNCYTIAL VIRUS BY PCR: NOT DETECTED
SARS-COV-2, NAAT: NOT DETECTED
SARS-COV-2, PCR: DETECTED
SODIUM BLD-SCNC: 139 MEQ/L (ref 135–144)
TOTAL PROTEIN: 8 G/DL (ref 6.3–8)
TROPONIN: <0.01 NG/ML (ref 0–0.01)
WBC # BLD: 12 K/UL (ref 4.5–11)

## 2021-11-19 PROCEDURE — 80053 COMPREHEN METABOLIC PANEL: CPT

## 2021-11-19 PROCEDURE — 2580000003 HC RX 258: Performed by: INTERNAL MEDICINE

## 2021-11-19 PROCEDURE — 96365 THER/PROPH/DIAG IV INF INIT: CPT

## 2021-11-19 PROCEDURE — 85025 COMPLETE CBC W/AUTO DIFF WBC: CPT

## 2021-11-19 PROCEDURE — 85610 PROTHROMBIN TIME: CPT

## 2021-11-19 PROCEDURE — 2060000000 HC ICU INTERMEDIATE R&B

## 2021-11-19 PROCEDURE — 96375 TX/PRO/DX INJ NEW DRUG ADDON: CPT

## 2021-11-19 PROCEDURE — 96372 THER/PROPH/DIAG INJ SC/IM: CPT

## 2021-11-19 PROCEDURE — 71275 CT ANGIOGRAPHY CHEST: CPT

## 2021-11-19 PROCEDURE — 36415 COLL VENOUS BLD VENIPUNCTURE: CPT

## 2021-11-19 PROCEDURE — 6360000002 HC RX W HCPCS: Performed by: INTERNAL MEDICINE

## 2021-11-19 PROCEDURE — 6360000002 HC RX W HCPCS: Performed by: EMERGENCY MEDICINE

## 2021-11-19 PROCEDURE — 2580000003 HC RX 258: Performed by: EMERGENCY MEDICINE

## 2021-11-19 PROCEDURE — 85730 THROMBOPLASTIN TIME PARTIAL: CPT

## 2021-11-19 PROCEDURE — 84484 ASSAY OF TROPONIN QUANT: CPT

## 2021-11-19 PROCEDURE — 87635 SARS-COV-2 COVID-19 AMP PRB: CPT

## 2021-11-19 PROCEDURE — 0202U NFCT DS 22 TRGT SARS-COV-2: CPT

## 2021-11-19 PROCEDURE — 6360000004 HC RX CONTRAST MEDICATION: Performed by: EMERGENCY MEDICINE

## 2021-11-19 PROCEDURE — 83735 ASSAY OF MAGNESIUM: CPT

## 2021-11-19 PROCEDURE — 87040 BLOOD CULTURE FOR BACTERIA: CPT

## 2021-11-19 PROCEDURE — 1210000000 HC MED SURG R&B

## 2021-11-19 PROCEDURE — 82728 ASSAY OF FERRITIN: CPT

## 2021-11-19 PROCEDURE — 99285 EMERGENCY DEPT VISIT HI MDM: CPT

## 2021-11-19 PROCEDURE — 83615 LACTATE (LD) (LDH) ENZYME: CPT

## 2021-11-19 RX ORDER — LEVOFLOXACIN 5 MG/ML
750 INJECTION, SOLUTION INTRAVENOUS ONCE
Status: COMPLETED | OUTPATIENT
Start: 2021-11-19 | End: 2021-11-19

## 2021-11-19 RX ORDER — 0.9 % SODIUM CHLORIDE 0.9 %
1000 INTRAVENOUS SOLUTION INTRAVENOUS ONCE
Status: COMPLETED | OUTPATIENT
Start: 2021-11-19 | End: 2021-11-19

## 2021-11-19 RX ORDER — LEVOFLOXACIN 5 MG/ML
750 INJECTION, SOLUTION INTRAVENOUS EVERY 24 HOURS
Status: DISCONTINUED | OUTPATIENT
Start: 2021-11-20 | End: 2021-11-22 | Stop reason: HOSPADM

## 2021-11-19 RX ORDER — SODIUM CHLORIDE 9 MG/ML
25 INJECTION, SOLUTION INTRAVENOUS PRN
Status: DISCONTINUED | OUTPATIENT
Start: 2021-11-19 | End: 2021-11-22 | Stop reason: HOSPADM

## 2021-11-19 RX ORDER — KETOROLAC TROMETHAMINE 30 MG/ML
30 INJECTION, SOLUTION INTRAMUSCULAR; INTRAVENOUS ONCE
Status: COMPLETED | OUTPATIENT
Start: 2021-11-19 | End: 2021-11-19

## 2021-11-19 RX ORDER — ONDANSETRON 4 MG/1
4 TABLET, ORALLY DISINTEGRATING ORAL EVERY 8 HOURS PRN
Status: DISCONTINUED | OUTPATIENT
Start: 2021-11-19 | End: 2021-11-22 | Stop reason: HOSPADM

## 2021-11-19 RX ORDER — SODIUM CHLORIDE 0.9 % (FLUSH) 0.9 %
10 SYRINGE (ML) INJECTION ONCE
Status: COMPLETED | OUTPATIENT
Start: 2021-11-19 | End: 2021-11-19

## 2021-11-19 RX ORDER — SODIUM CHLORIDE 0.9 % (FLUSH) 0.9 %
5-40 SYRINGE (ML) INJECTION PRN
Status: DISCONTINUED | OUTPATIENT
Start: 2021-11-19 | End: 2021-11-22 | Stop reason: HOSPADM

## 2021-11-19 RX ORDER — SODIUM CHLORIDE 0.9 % (FLUSH) 0.9 %
5-40 SYRINGE (ML) INJECTION EVERY 12 HOURS SCHEDULED
Status: DISCONTINUED | OUTPATIENT
Start: 2021-11-19 | End: 2021-11-22 | Stop reason: HOSPADM

## 2021-11-19 RX ORDER — ACETAMINOPHEN 325 MG/1
650 TABLET ORAL EVERY 6 HOURS PRN
Status: DISCONTINUED | OUTPATIENT
Start: 2021-11-19 | End: 2021-11-22 | Stop reason: HOSPADM

## 2021-11-19 RX ORDER — POLYETHYLENE GLYCOL 3350 17 G/17G
17 POWDER, FOR SOLUTION ORAL DAILY PRN
Status: DISCONTINUED | OUTPATIENT
Start: 2021-11-19 | End: 2021-11-22 | Stop reason: HOSPADM

## 2021-11-19 RX ORDER — DEXAMETHASONE SODIUM PHOSPHATE 10 MG/ML
6 INJECTION INTRAMUSCULAR; INTRAVENOUS ONCE
Status: COMPLETED | OUTPATIENT
Start: 2021-11-19 | End: 2021-11-19

## 2021-11-19 RX ORDER — GUAIFENESIN/DEXTROMETHORPHAN 100-10MG/5
5 SYRUP ORAL EVERY 4 HOURS PRN
Status: DISCONTINUED | OUTPATIENT
Start: 2021-11-19 | End: 2021-11-22 | Stop reason: HOSPADM

## 2021-11-19 RX ORDER — ACETAMINOPHEN 650 MG/1
650 SUPPOSITORY RECTAL EVERY 6 HOURS PRN
Status: DISCONTINUED | OUTPATIENT
Start: 2021-11-19 | End: 2021-11-22 | Stop reason: HOSPADM

## 2021-11-19 RX ORDER — ONDANSETRON 2 MG/ML
4 INJECTION INTRAMUSCULAR; INTRAVENOUS ONCE
Status: COMPLETED | OUTPATIENT
Start: 2021-11-19 | End: 2021-11-19

## 2021-11-19 RX ORDER — ONDANSETRON 2 MG/ML
4 INJECTION INTRAMUSCULAR; INTRAVENOUS EVERY 6 HOURS PRN
Status: DISCONTINUED | OUTPATIENT
Start: 2021-11-19 | End: 2021-11-22 | Stop reason: HOSPADM

## 2021-11-19 RX ADMIN — Medication 10 ML: at 21:38

## 2021-11-19 RX ADMIN — ONDANSETRON 4 MG: 2 INJECTION INTRAMUSCULAR; INTRAVENOUS at 06:04

## 2021-11-19 RX ADMIN — ENOXAPARIN SODIUM 100 MG: 100 INJECTION SUBCUTANEOUS at 08:51

## 2021-11-19 RX ADMIN — KETOROLAC TROMETHAMINE 30 MG: 30 INJECTION, SOLUTION INTRAMUSCULAR; INTRAVENOUS at 06:04

## 2021-11-19 RX ADMIN — SODIUM CHLORIDE, PRESERVATIVE FREE 10 ML: 5 INJECTION INTRAVENOUS at 07:05

## 2021-11-19 RX ADMIN — LEVOFLOXACIN 750 MG: 5 INJECTION, SOLUTION INTRAVENOUS at 08:50

## 2021-11-19 RX ADMIN — SODIUM CHLORIDE 1000 ML: 9 INJECTION, SOLUTION INTRAVENOUS at 06:04

## 2021-11-19 RX ADMIN — IOPAMIDOL 100 ML: 755 INJECTION, SOLUTION INTRAVENOUS at 07:03

## 2021-11-19 RX ADMIN — ENOXAPARIN SODIUM 100 MG: 100 INJECTION SUBCUTANEOUS at 20:34

## 2021-11-19 RX ADMIN — DEXAMETHASONE SODIUM PHOSPHATE 6 MG: 10 INJECTION INTRAMUSCULAR; INTRAVENOUS at 08:51

## 2021-11-19 ASSESSMENT — ENCOUNTER SYMPTOMS
ABDOMINAL PAIN: 0
VOMITING: 0
CHEST TIGHTNESS: 0
PHOTOPHOBIA: 0
SORE THROAT: 0
COUGH: 0
ABDOMINAL DISTENTION: 0
WHEEZING: 0
EYE DISCHARGE: 0
SHORTNESS OF BREATH: 1

## 2021-11-19 ASSESSMENT — PAIN SCALES - GENERAL
PAINLEVEL_OUTOF10: 9
PAINLEVEL_OUTOF10: 5
PAINLEVEL_OUTOF10: 9

## 2021-11-19 ASSESSMENT — PAIN SCALES - WONG BAKER: WONGBAKER_NUMERICALRESPONSE: 0

## 2021-11-19 ASSESSMENT — PAIN DESCRIPTION - LOCATION
LOCATION: ABDOMEN
LOCATION: ABDOMEN

## 2021-11-19 ASSESSMENT — PAIN DESCRIPTION - DESCRIPTORS
DESCRIPTORS: ACHING;RADIATING
DESCRIPTORS: ACHING

## 2021-11-19 ASSESSMENT — PAIN DESCRIPTION - FREQUENCY
FREQUENCY: INTERMITTENT
FREQUENCY: INTERMITTENT

## 2021-11-19 ASSESSMENT — PAIN DESCRIPTION - PAIN TYPE: TYPE: ACUTE PAIN

## 2021-11-19 ASSESSMENT — PAIN DESCRIPTION - ORIENTATION: ORIENTATION: LEFT

## 2021-11-19 NOTE — ED NOTES
Pt declines any food or snacks at this time. Pt given warm blankets and juice to drink. Pt is alert and oriented tiems 4. Pt has no sign of distress noted at this time. Pt is voicing no complaints.       Ravi Armstrong RN  11/19/21 2204

## 2021-11-19 NOTE — ED NOTES
Pt a/o x 3 skin pink w/d resp non labored. Pt denies sob or pain at present. Pt requesting food tray. Smith Aguilera RN  11/19/21 4487

## 2021-11-19 NOTE — ED TRIAGE NOTES
Pt arrived to ED from home. Pt states he all day yesterday he was having LUQ pain. Pt states throughout night the pain did not get better. Pt states breathing is painful. Pt denies chest pain, pt states he is sob. Pt rate pain 9/10. Pt is alert and oriented x 4. Pt states pain radiates to back as well.

## 2021-11-19 NOTE — ED NOTES
Pt awake alert skin pink w/d resp non labored no change in pt condition     Ramírez Hernandez  11/19/21 2348

## 2021-11-19 NOTE — CARE COORDINATION
[facility] Case Management   Initial Discharge Assessment    Met with patient at bedside in ER to discuss discharge plan. PCP: None. List given and importance of medical follow-up discussed. Date of Last Visit: NA  If no PCP, list provided? Yes    Discharge Planning    Living Arrangements: Patient lives independently at home . Who do you live with? Father Gail Dangelo ([phone]). Who helps you with your care: Patient is fully independent. If lives at home: Do you have any barriers navigating in your home? No    Patient can perform ADL? Yes    Current Services (outpatient and in home): None    Dialysis: No    Is transportation available to get to your appointments? Yes - Patient drives or has friends that can assist with transport. DME Equipment: No    Respiratory equipment: None    Respiratory provider: SUSANNA     Pharmacy: [facility] on [address] in [address] [place] [address] with Medication Assistance Program?  Yes      Patient agreeable to [address]? N/A    Patient agreeable to SNF/Rehab? N/A    Other discharge needs identified? Other - Patient will need a PCP for follow-up, meds-to-beds, and assistance with a Medicaid application. This CM advised [facility] department of patient's needs. Does Patient Have a High-Risk for Readmission Diagnosis (CHF, PN, MI, COPD)? Yes - Current pneumonia and PE. Initial Discharge Plan? (Note: please see concurrent daily documentation for any updates after initial note). Home with family, additional needs TBD.     Readmission Risk              Risk of Unplanned Readmission:  0         Electronically signed by Andrew Schroeder RN on 11/19/2021 at 9:29 AM

## 2021-11-19 NOTE — H&P
[place] [id number] MEDICINE    HISTORY AND PHYSICAL EXAM    PATIENT NAME:  Nola Givens    MRN:  [id number]  SERVICE DATE:  11/19/2021   SERVICE TIME:  9:11 AM    Primary Care Physician: Linette Mckenzie DO     SUBJECTIVE  CHIEF COMPLAINT: Left-sided rib pain    HPI:  Nola Givens is a 22 y.o. male who presents with complaint of left-sided rib pain and generally feeling unwell. PMH significant for  has no past medical history on file. .      Patient is a 26-year-old male with no significant past medical history who presents to the ED with complaint of lower left-sided subjective rib pain and generally feeling unwell. He was previously seen approximately 1 week ago with nausea, vomiting, and small volume hemoptysis which was presumed to be a sinus infection for which she was given azithromycin. In the interim he has had no further hemoptysis, but does continue to feel generally unwell and have intermittent mild nausea and vomiting. On presentation to ED today, work-up is significant for WBC 12.0, bilateral (L >R) pneumonia on chest CT, and LLL acute pulmonary embolism. Rapid Covid test is negative, however CT imaging and presence of VTE are concerning for false negative in setting of possible actual Covid. Patient is unvaccinated. Patient has no known recent sick contacts, is a non-smoker, and has no known family history of coagulopathy. He additionally denies any aspiration events, any recreational drug use, or any alcohol use. On examination in the ED, patient is found to be in no acute distress, breathing easily on room air but appearing slightly ill. He reports some nausea and dry heaving earlier today. He denies fevers, chills, any further hemoptysis after the episodes approximately 1 week ago, diarrhea, shortness of breath, GRAVES, swelling, or orthopnea. He is a full code. PAST MEDICAL HISTORY:  No past medical history on file.   PAST SURGICAL HISTORY:  No past surgical history on file.  FAMILY HISTORY:  No family history on file. SOCIAL HISTORY:    Social History     Socioeconomic History    Marital status: Single     Spouse name: Not on file    Number of children: Not on file    Years of education: Not on file    Highest education level: Not on file   Occupational History    Not on file   Tobacco Use    Smoking status: Current Some Day Smoker     Types: Cigars    Smokeless tobacco: Never Used   Vaping Use    Vaping Use: Never used   Substance and Sexual Activity    Alcohol use: No    Drug use: Yes     Types: Marijuana Camelia Paco)    Sexual activity: Not on file   Other Topics Concern    Not on file   Social History Narrative    Not on file     Social Determinants of Health     Financial Resource Strain:     Difficulty of Paying Living Expenses: Not on file   Food Insecurity:     Worried About Running Out of Food in the Last Year: Not on file    [place] of Food in the Last Year: Not on file   Transportation Needs:     Lack of Transportation (Medical): Not on file    Lack of Transportation (Non-Medical):  Not on file   Physical Activity:     Days of Exercise per Week: Not on file    Minutes of Exercise per Session: Not on file   Stress:     Feeling of Stress : Not on file   Social Connections:     Frequency of Communication with Friends and Family: Not on file    Frequency of Social Gatherings with Friends and Family: Not on file    Attends Yazdanism Services: Not on file    Active Member of [address] [place] [address] or Organizations: Not on file    Attends Club or Organization Meetings: Not on file    Marital Status: Not on file   Intimate Partner Violence:     Fear of Current or Ex-Partner: Not on file    Emotionally Abused: Not on file    Physically Abused: Not on file    Sexually Abused: Not on file   Housing Stability:     Unable to Pay for Housing in the Last Year: Not on file    Number of [place] in the Last Year: Not on file    Unstable Housing in the Last Year: Not on file     MEDICATIONS:   Prior to Admission medications    Medication Sig Start Date End Date Taking? Authorizing Provider   ondansetron (ZOFRAN-ODT) 4 MG disintegrating tablet Take 1 tablet by mouth 3 times daily as needed for Nausea or Vomiting 11/14/21   Preeti Dodd DO   naproxen (NAPROSYN) 500 MG tablet Take 1 tablet by mouth 2 times daily for 20 doses 7/16/19 7/26/19  ANURAG Brown CNP   famotidine (PEPCID) 20 MG tablet Take 1 tablet by mouth 2 times daily 7/12/19 3/26/21  Hamlet Parker PA-C   ibuprofen (IBU) 400 MG tablet Take 1 tablet by mouth every 6 hours as needed for Pain 7/12/19 3/26/21  Hamlet Parker PA-C       ALLERGIES: Patient has no known allergies. REVIEW OF SYSTEM:   A full 12 point review of systems was completed, and was unremarkable except as specified above. OBJECTIVE    /67   Pulse 75   Temp 97.5 °F (36.4 °C)   Resp 14   Ht 5' 6\" (1.676 m)   Wt 220 lb (99.8 kg)   SpO2 98%   BMI 35.51 kg/m²     PHYSICAL EXAM:   Constitutional: Young adult male reclining in ED bed no acute distress, appearing slightly ill. Head: NCAT  Eyes: PERRLA, EOMI.  ENT: Hearing grossly intact. No rhinorrhea or epistaxis. Anterior oropharynx unremarkable. Neck: Trachea midline, phonation normal.  Cardiovascular: RRR, no murmurs appreciated. Warm and well perfused peripherally. No pretibial edema. Pulmonary: Normal rate and effort of respiration on room air. Diminished lung sounds in LML, LLL, RML. Other lung fields grossly CTA B, no rhonchi, rales, wheezing appreciated. No coughing during exam.  Abdomen: Soft, nontense, nondistended abdomen. Hypoactive bowel sounds. Mild generalized nausea exacerbation with palpation but no focal pain. Neurologic: Grossly alert and oriented. No focal neurologic deficits appreciated. Psychiatric: Pleasant, calm, cooperative. DATA:     Diagnostic tests reviewed for today's visit:    Most recent labs and imaging results reviewed. LABS:    Recent Results (from the past 24 hour(s))   Comprehensive Metabolic Panel    Collection Time: 11/19/21  6:00 AM   Result Value Ref Range    Sodium 139 135 - 144 mEq/L    Potassium 3.4 3.4 - 4.9 mEq/L    Chloride 99 95 - 107 mEq/L    CO2 25 20 - 31 mEq/L    Anion Gap 15 9 - 15 mEq/L    Glucose 126 (H) 70 - 99 mg/dL    BUN 6 6 - 20 mg/dL    CREATININE 1.03 0.70 - 1.20 mg/dL    GFR Non-African American >60.0 >60    GFR  >60.0 >60    Calcium 9.4 8.5 - 9.9 mg/dL    Total Protein 8.0 6.3 - 8.0 g/dL    Albumin 4.0 3.5 - 4.6 g/dL    Total Bilirubin 1.1 (H) 0.2 - 0.7 mg/dL    Alkaline Phosphatase 61 35 - 104 U/L    ALT 39 0 - 41 U/L    AST 28 0 - 40 U/L    Globulin 4.0 (H) 2.3 - 3.5 g/dL   POCT CREATININE    Collection Time: 11/19/21  6:13 AM   Result Value Ref Range    POC CREATININE WHOLE BLOOD 1.1    COVID-19, Rapid    Collection Time: 11/19/21  7:58 AM    Specimen: Nasopharyngeal Swab   Result Value Ref Range    SARS-CoV-2, NAAT Not Detected Not Detected   Magnesium    Collection Time: 11/19/21  8:06 AM   Result Value Ref Range    Magnesium 2.5 (H) 1.7 - 2.4 mg/dL   Troponin    Collection Time: 11/19/21  8:06 AM   Result Value Ref Range    Troponin <0.010 0.000 - 0.010 ng/mL   APTT    Collection Time: 11/19/21  8:06 AM   Result Value Ref Range    aPTT 34.9 24.4 - 36.8 sec   Protime-INR    Collection Time: 11/19/21  8:06 AM   Result Value Ref Range    Protime 15.3 (H) 12.3 - 14.9 sec    INR 1.2    SPECIMEN REJECTION    Collection Time: 11/19/21  8:29 AM   Result Value Ref Range    Rejected Test CBCWD     Reason for Rejection see below    CBC Auto Differential    Collection Time: 11/19/21  8:37 AM   Result Value Ref Range    WBC 12.0 (H) 4.5 - 11.0 K/uL    RBC 5.34 4.70 - 6.10 M/uL    Hemoglobin 12.8 (L) 14.0 - 18.0 g/dL    Hematocrit 40.8 (L) 42.0 - 52.0 %    MCV 76.3 (L) 80.0 - 100.0 fL    MCH 24.0 (L) 27.0 - 31.3 pg    MCHC 31.4 (L) 33.0 - 37.0 %    RDW 13.7 11.5 - 14.5 %    Platelets 506 130 - 400 K/uL    Neutrophils % 79.8 %    Lymphocytes % 6.6 %    Monocytes % 12.1 %    Eosinophils % 0.2 %    Basophils % 1.3 %    Neutrophils Absolute 9.6 (H) 1.4 - 6.5 K/uL    Lymphocytes Absolute 0.8 (L) 1.0 - 4.8 K/uL    Monocytes Absolute 1.5 (H) 0.2 - 0.8 K/uL    Eosinophils Absolute 0.0 0.0 - 0.7 K/uL    Basophils Absolute 0.2 0.0 - 0.2 K/uL       IMAGING:      CTA Chest W WO  (PE study)  Result Date: 11/19/2021  EXAM: CTA CHEST W WO CONTRAST INDICATION:  left lower chest pain and pleurisy COMPARISON: None. TECHNIQUE: Multidetector CT imaging was performed through the lungs in the supine position following the administration of intravenous contrast according to the CTPA protocol. Additional maximum intensity projection images were performed. All CT scans at this facility use dose modulation, iterative reconstruction, and/or weight based dosing when appropriate to reduce radiation dose to as low as reasonably achievable. CONTRAST: 100 mL of Isovue-370 FINDINGS: This is a good quality study for the evaluation of pulmonary embolism. PULMONARY ARTERIES & 3D RECONSTRUCTIONS: Normal in caliber. There is a medium to large caliber filling defect in the left lower lobar pulmonary artery extending distally. HEART: The heart is normal in size. No significant pericardial effusion. THORACIC AORTA: Normal caliber and contour. MEDIASTINUM AND SANDEEP: No pathologically enlarged lymph nodes. CHEST WALL AND AXILLA: Unremarkable. PLEURA: No pleural effusions or pneumothorax. AIRWAYS & LUNGS: The central airways are patent. There are bilateral consolidation in the left upper lobe, medial right upper lobe and the bilateral lower lobes, however predominantly affecting the left upper lobe. There is relative sparing of the right middle lobe. UPPER ABDOMEN: Unremarkable. MUSCULOSKELETAL: No suspicious osteolytic or osteoblastic lesions. 1.Left lower lobar pulmonary embolism with extension distally.  2.Bilateral parenchymal opacities is suggestive of multifocal pneumonia, including viral etiologies. Critical results were communicated to Dr. Monique Salguero on 11/19/2021 at 7:32 AM.      ASSESSMENT AND PLAN  Active Problems:  #Bilateral pneumonia, L>R, concerning for COVID despite negative initial screening antigen test , in unvaccinated patient  #Acute LLL Pulmonary Embolism    Plan:  -Admit to Inpatient on Telemetry  -empiric Covid precautions initially, pending further work-up  -not requiring oxygen at present.   No indication for Decadron/Remdesivir at present.  -Check viral PCR panel with COVID-19, due to higher sensitivity/specificity  -Consult Infectious Disease in setting of suspected Covid   despite negative initial test, appreciate recommendations.  -Consult Pulmonology: Appreciate recommendations  -Treatment dose Lovenox at 1mg/kg BID for PE  -Follow-up on blood cultures obtained in ED  -Obtain sputum culture and gram stain  -Daily labs to include CBC, CMP, LDH, Ferritin, Procal  -Incentive spirometry  -Adult regular diet      SIGNATURE: Edilberto Trujillo,   DATE: November 19, 2021  TIME: 9:11 AM

## 2021-11-19 NOTE — ED PROVIDER NOTES
[id number] ED  eMERGENCY dEPARTMENT eNCOUnter      Pt Name: Dannie Aceves  MRN: [id number]  [place] 2000  Date of evaluation: 11/19/2021  Provider: Carlos Uribe MD    CHIEF COMPLAINT       Chief Complaint   Patient presents with    Shortness of Breath         HISTORY OF PRESENT ILLNESS   (Location/Symptom, Timing/Onset,Context/Setting, Quality, Duration, Modifying Factors, Severity)  Note limiting factors. Dannie Aceves is a 22 y.o. male who presents to the emergency department for evaluation of left lower rib pain and shortness of breath. Patient symptoms began over the past 12 hours. He has had mild nausea but no vomiting. He does not have any PE risk factors or leg swelling. He states the pain worsens with deep breath or certain movements and consistent with that of a pleuritic-like pain. No hemoptysis. HPI    NursingNotes were reviewed. REVIEW OF SYSTEMS    (2-9 systems for level 4, 10 or more for level 5)     Review of Systems   Constitutional: Negative for chills and diaphoresis. HENT: Negative for congestion, ear pain, mouth sores and sore throat. Eyes: Negative for photophobia and discharge. Respiratory: Positive for shortness of breath. Negative for cough, chest tightness and wheezing. Cardiovascular: Positive for chest pain. Negative for palpitations. Gastrointestinal: Negative for abdominal distention, abdominal pain and vomiting. Endocrine: Negative for cold intolerance. Genitourinary: Negative for difficulty urinating. Musculoskeletal: Negative for arthralgias. Skin: Negative for pallor and rash. Allergic/Immunologic: Negative for immunocompromised state. Neurological: Negative for dizziness and syncope. Hematological: Negative for adenopathy. Psychiatric/Behavioral: Negative for agitation and hallucinations. All other systems reviewed and are negative.       Except as noted above the remainder of the review of systems was reviewed and negative. PAST MEDICAL HISTORY   No past medical history on file. SURGICALHISTORY     No past surgical history on file. CURRENT MEDICATIONS       Previous Medications    NAPROXEN (NAPROSYN) 500 MG TABLET    Take 1 tablet by mouth 2 times daily for 20 doses    ONDANSETRON (ZOFRAN-ODT) 4 MG DISINTEGRATING TABLET    Take 1 tablet by mouth 3 times daily as needed for Nausea or Vomiting       ALLERGIES     Patient has no known allergies. FAMILY HISTORY     No family history on file. SOCIAL HISTORY       Social History     Socioeconomic History    Marital status: Single     Spouse name: Not on file    Number of children: Not on file    Years of education: Not on file    Highest education level: Not on file   Occupational History    Not on file   Tobacco Use    Smoking status: Current Some Day Smoker     Types: Cigars    Smokeless tobacco: Never Used   Vaping Use    Vaping Use: Never used   Substance and Sexual Activity    Alcohol use: No    Drug use: Yes     Types: Marijuana Christina Kugel)    Sexual activity: Not on file   Other Topics Concern    Not on file   Social History Narrative    Not on file     Social Determinants of Health     Financial Resource Strain:     Difficulty of Paying Living Expenses: Not on file   Food Insecurity:     Worried About Running Out of Food in the Last Year: Not on file    [place] of Food in the Last Year: Not on file   Transportation Needs:     Lack of Transportation (Medical): Not on file    Lack of Transportation (Non-Medical):  Not on file   Physical Activity:     Days of Exercise per Week: Not on file    Minutes of Exercise per Session: Not on file   Stress:     Feeling of Stress : Not on file   Social Connections:     Frequency of Communication with Friends and Family: Not on file    Frequency of Social Gatherings with Friends and Family: Not on file    Attends Taoism Services: Not on file    Active Member of Clubs or Organizations: Not on file   [address] or Organization Meetings: Not on file    Marital Status: Not on file   Intimate Partner Violence:     Fear of Current or Ex-Partner: Not on file    Emotionally Abused: Not on file    Physically Abused: Not on file    Sexually Abused: Not on file   Housing Stability:     Unable to Pay for Housing in the Last Year: Not on file    Number of [place] in the Last Year: Not on file    Unstable Housing in the Last Year: Not on file       SCREENINGS    [place] Coma Scale  Eye Opening: Spontaneous  Best Verbal Response: Oriented  Best Motor Response: Obeys commands  Dante Coma Scale Score: 15 @FLOW([id number])@      PHYSICAL EXAM    (up to 7 for level 4, 8 or more for level 5)     ED Triage Vitals [11/19/21 0529]   BP Temp Temp src Pulse Resp SpO2 Height Weight   119/79 97.5 °F (36.4 °C) -- 102 18 98 % 5' 6\" (1.676 m) 220 lb (99.8 kg)       Physical Exam  Vitals and nursing note reviewed. Constitutional:       Appearance: He is well-developed. HENT:      Head: Normocephalic. Nose: Nose normal.   Eyes:      Conjunctiva/sclera: Conjunctivae normal.      Pupils: Pupils are equal, round, and reactive to light. Cardiovascular:      Rate and Rhythm: Normal rate and regular rhythm. Heart sounds: Normal heart sounds. Pulmonary:      Effort: Pulmonary effort is normal.      Breath sounds: Normal breath sounds. No decreased breath sounds, wheezing, rhonchi or rales. Abdominal:      General: Bowel sounds are normal.      Palpations: Abdomen is soft. Tenderness: There is no abdominal tenderness. There is no guarding. Musculoskeletal:         General: Normal range of motion. Cervical back: Normal range of motion and neck supple. Skin:     General: Skin is warm and dry. Capillary Refill: Capillary refill takes less than 2 seconds. Neurological:      Mental Status: He is alert and oriented to person, place, and time.    Psychiatric:         Mood and Affect: Mood normal.         DIAGNOSTIC RESULTS     EKG: All EKG's are interpreted by the Emergency Department Physician who either signs or Co-signsthis chart in the absence of a cardiologist.        RADIOLOGY:   Griffin Allis such as CT, Ultrasound and MRI are read by the radiologist. Plain radiographic images are visualized and preliminarily interpreted by the emergency physician with the below findings:      Interpretation per the Radiologist below, if available at the time ofthis note:    CTA Chest W WO  (PE study)   Final Result      1. Left lower lobar pulmonary embolism with extension distally. 2.Bilateral parenchymal opacities is suggestive of multifocal pneumonia, including viral etiologies. Critical results were communicated to Dr. Jazzy Cedeño on 11/19/2021 at 7:32 AM.            ED BEDSIDE ULTRASOUND:   Performed by ED Physician - none    LABS:  Labs Reviewed   COMPREHENSIVE METABOLIC PANEL - Abnormal; Notable for the following components:       Result Value    Glucose 126 (*)     Total Bilirubin 1.1 (*)     Globulin 4.0 (*)     All other components within normal limits   MAGNESIUM - Abnormal; Notable for the following components:    Magnesium 2.5 (*)     All other components within normal limits   POCT CREATININE - URINE - Normal   COVID-19, RAPID   CULTURE, BLOOD 1   CULTURE, BLOOD 2   TROPONIN   SPECIMEN REJECTION   APTT   PROTIME-INR   CBC WITH AUTO DIFFERENTIAL       All other labs were within normal range or not returned as of this dictation. EMERGENCY DEPARTMENT COURSE and DIFFERENTIAL DIAGNOSIS/MDM:   Vitals:    Vitals:    11/19/21 0630 11/19/21 0715 11/19/21 0745 11/19/21 0800   BP: 114/72   97/71   Pulse: 77 89 74 82   Resp: 17 15 10 17   Temp:       SpO2:  99% 98%    Weight:       Height:              MDM  21 yo male presents to the ED with L sided chest pain. Pt is afebrile, hemodynamically stable. Labs unremarkable. CT PE shows bilateral pneumonia and LLL PE.  Pt's covid test negative. Pt does not smoke. Blood cultures drawn and pt given IV levaquin in the ED. Given PE, pt given subQ lovenox in the ED. Pt educated about PE and pneumonia. However, given pneumonia, PE, case discussed with Dr. Bee Weldon (medicine) and pt admitted to medicine in stable condition. Sherryle Solomons, MD    CONSULTS:  None    PROCEDURES:  Unless otherwise noted below, none     Procedures    FINAL IMPRESSION      1. Pneumonia due to infectious organism, unspecified laterality, unspecified part of lung    2. Acute pulmonary embolism, unspecified pulmonary embolism type, unspecified whether acute cor pulmonale present (HCC)          DISPOSITION/PLAN   DISPOSITION        PATIENT REFERRED TO:  No follow-up provider specified.     DISCHARGE MEDICATIONS:  New Prescriptions    No medications on file          (Please note that portions of this note were completed with a voice recognition program.  Efforts were made to edit the dictations but occasionally words are mis-transcribed.)    Silverio Hassan MD (electronically signed)  Attending Emergency Physician          Silverio Hassan MD  11/19/21 1390

## 2021-11-20 LAB
ALBUMIN SERPL-MCNC: 3.3 G/DL (ref 3.5–4.6)
ALP BLD-CCNC: 50 U/L (ref 35–104)
ALT SERPL-CCNC: 32 U/L (ref 0–41)
ANION GAP SERPL CALCULATED.3IONS-SCNC: 11 MEQ/L (ref 9–15)
AST SERPL-CCNC: 21 U/L (ref 0–40)
BASOPHILS ABSOLUTE: 0 K/UL (ref 0–0.2)
BASOPHILS RELATIVE PERCENT: 0.5 %
BILIRUB SERPL-MCNC: 0.5 MG/DL (ref 0.2–0.7)
BUN BLDV-MCNC: 9 MG/DL (ref 6–20)
CALCIUM SERPL-MCNC: 9 MG/DL (ref 8.5–9.9)
CHLORIDE BLD-SCNC: 100 MEQ/L (ref 95–107)
CO2: 29 MEQ/L (ref 20–31)
CREAT SERPL-MCNC: 1.05 MG/DL (ref 0.7–1.2)
EOSINOPHILS ABSOLUTE: 0 K/UL (ref 0–0.7)
EOSINOPHILS RELATIVE PERCENT: 0 %
FERRITIN: 532.4 NG/ML (ref 30–400)
FERRITIN: 584.9 NG/ML (ref 30–400)
GFR AFRICAN AMERICAN: >60
GFR NON-AFRICAN AMERICAN: >60
GLOBULIN: 3.5 G/DL (ref 2.3–3.5)
GLUCOSE BLD-MCNC: 93 MG/DL (ref 70–99)
HCT VFR BLD CALC: 40.2 % (ref 42–52)
HEMOGLOBIN: 12.9 G/DL (ref 14–18)
LACTATE DEHYDROGENASE: 205 U/L (ref 135–225)
LYMPHOCYTES ABSOLUTE: 1.6 K/UL (ref 1–4.8)
LYMPHOCYTES RELATIVE PERCENT: 17.3 %
MAGNESIUM: 2.2 MG/DL (ref 1.7–2.4)
MCH RBC QN AUTO: 24.2 PG (ref 27–31.3)
MCHC RBC AUTO-ENTMCNC: 32 % (ref 33–37)
MCV RBC AUTO: 75.4 FL (ref 80–100)
MONOCYTES ABSOLUTE: 1.2 K/UL (ref 0.2–0.8)
MONOCYTES RELATIVE PERCENT: 12.2 %
NEUTROPHILS ABSOLUTE: 6.6 K/UL (ref 1.4–6.5)
NEUTROPHILS RELATIVE PERCENT: 70 %
PDW BLD-RTO: 13.7 % (ref 11.5–14.5)
PLATELET # BLD: 438 K/UL (ref 130–400)
POTASSIUM REFLEX MAGNESIUM: 3.5 MEQ/L (ref 3.4–4.9)
PROCALCITONIN: <0.02 NG/ML (ref 0–0.15)
RBC # BLD: 5.33 M/UL (ref 4.7–6.1)
SODIUM BLD-SCNC: 140 MEQ/L (ref 135–144)
TOTAL PROTEIN: 6.8 G/DL (ref 6.3–8)
WBC # BLD: 9.5 K/UL (ref 4.5–11)

## 2021-11-20 PROCEDURE — 6360000002 HC RX W HCPCS: Performed by: INTERNAL MEDICINE

## 2021-11-20 PROCEDURE — 36415 COLL VENOUS BLD VENIPUNCTURE: CPT

## 2021-11-20 PROCEDURE — 6370000000 HC RX 637 (ALT 250 FOR IP): Performed by: NURSE PRACTITIONER

## 2021-11-20 PROCEDURE — 80053 COMPREHEN METABOLIC PANEL: CPT

## 2021-11-20 PROCEDURE — 83615 LACTATE (LD) (LDH) ENZYME: CPT

## 2021-11-20 PROCEDURE — 99232 SBSQ HOSP IP/OBS MODERATE 35: CPT | Performed by: INTERNAL MEDICINE

## 2021-11-20 PROCEDURE — 82728 ASSAY OF FERRITIN: CPT

## 2021-11-20 PROCEDURE — 99254 IP/OBS CNSLTJ NEW/EST MOD 60: CPT | Performed by: INTERNAL MEDICINE

## 2021-11-20 PROCEDURE — 1210000000 HC MED SURG R&B

## 2021-11-20 PROCEDURE — 84145 PROCALCITONIN (PCT): CPT

## 2021-11-20 PROCEDURE — 2580000003 HC RX 258: Performed by: INTERNAL MEDICINE

## 2021-11-20 PROCEDURE — 85025 COMPLETE CBC W/AUTO DIFF WBC: CPT

## 2021-11-20 PROCEDURE — 83735 ASSAY OF MAGNESIUM: CPT

## 2021-11-20 PROCEDURE — 6370000000 HC RX 637 (ALT 250 FOR IP): Performed by: INTERNAL MEDICINE

## 2021-11-20 PROCEDURE — 2060000000 HC ICU INTERMEDIATE R&B

## 2021-11-20 RX ORDER — LIDOCAINE 4 G/G
1 PATCH TOPICAL DAILY
Status: DISCONTINUED | OUTPATIENT
Start: 2021-11-20 | End: 2021-11-22 | Stop reason: HOSPADM

## 2021-11-20 RX ADMIN — LEVOFLOXACIN 750 MG: 5 INJECTION, SOLUTION INTRAVENOUS at 10:45

## 2021-11-20 RX ADMIN — ACETAMINOPHEN 650 MG: 325 TABLET ORAL at 17:21

## 2021-11-20 RX ADMIN — Medication 10 ML: at 20:35

## 2021-11-20 RX ADMIN — GUAIFENESIN SYRUP AND DEXTROMETHORPHAN 5 ML: 100; 10 SYRUP ORAL at 10:45

## 2021-11-20 RX ADMIN — Medication 10 ML: at 10:44

## 2021-11-20 RX ADMIN — ENOXAPARIN SODIUM 100 MG: 100 INJECTION SUBCUTANEOUS at 20:35

## 2021-11-20 RX ADMIN — ENOXAPARIN SODIUM 100 MG: 100 INJECTION SUBCUTANEOUS at 10:44

## 2021-11-20 ASSESSMENT — PAIN DESCRIPTION - DIRECTION: RADIATING_TOWARDS: LEFT FLANK

## 2021-11-20 ASSESSMENT — PAIN SCALES - GENERAL: PAINLEVEL_OUTOF10: 8

## 2021-11-20 ASSESSMENT — PAIN DESCRIPTION - FREQUENCY: FREQUENCY: INTERMITTENT

## 2021-11-20 ASSESSMENT — PAIN DESCRIPTION - PAIN TYPE: TYPE: ACUTE PAIN

## 2021-11-20 ASSESSMENT — PAIN DESCRIPTION - ORIENTATION: ORIENTATION: LEFT

## 2021-11-20 ASSESSMENT — PAIN DESCRIPTION - LOCATION: LOCATION: OTHER (COMMENT)

## 2021-11-20 ASSESSMENT — PAIN DESCRIPTION - DESCRIPTORS: DESCRIPTORS: ACHING;SHARP;SORE

## 2021-11-20 NOTE — PROGRESS NOTES
Infectious Disease Progress Note       11/20/2021    Patient is a followup regarding  COVID 19 Pneumonia  Possible secondary bacterial pneumonia    CTA with LLL PE  procal pending  No fevers  Currently on Levaquin as well   On room air. Room air. Neck supple  Chest equal expansion  Abdomen ND  Extrem no new changes  Expressions symmetrical  No obvious rashes. Lab Results   Component Value Date    WBC 9.5 11/20/2021    HGB 12.9 (L) 11/20/2021    HCT 40.2 (L) 11/20/2021    MCV 75.4 (L) 11/20/2021     (H) 11/20/2021     Lab Results   Component Value Date     11/20/2021    K 3.5 11/20/2021     11/20/2021    CO2 29 11/20/2021    BUN 9 11/20/2021    CREATININE 1.05 11/20/2021    GLUCOSE 93 11/20/2021    CALCIUM 9.0 11/20/2021        WBC trends are being monitored. Antibiotic doses are being adjusted per most recent renal labs. Vitals:    11/20/21 1058   BP: 122/65   Pulse: 75   Resp: 20   Temp:    SpO2: 100%           Patient Active Problem List   Diagnosis    Pneumonia           ASSESSMENT:  COVID 19 pneumonia  LLL pulmonary embolism      PLAN:  No new changes. anticoagulation   Not currently needing O2 support. Continue to follow.      Imaging and labs were reviewed per medical records and any ID pertinent labs were also addressed    Rochelle Carlos DO

## 2021-11-20 NOTE — CONSULTS
Pulmonary Medicine  Consult Note      Reason for consultation: Pulmonary embolism left lower lobe, COVID-19 pneumonia    Consulting physician: Dr. Hans Lees:      Is a 81-year-old male with no past significant medical history who was presented to ER with complaint of left-sided rib pain and not feeling well. Approximately 1 week ago nausea, vomiting and bloodstained mucus which was presumed to be sinus infection for which he was given Zithromax. He has no further hemoptysis but appears to have intermittent mild nausea and vomiting as well as left-sided pain. He was presented to ER where patient had CT chest and found left lower lobe acute pulmonary embolism. Repeat Covid test was negative however repeat PCR testing was positive. He is not vaccinated. Patient has no family history of coagulopathy. No sick contact. He is non-smoker. Denies using alcohol or drug. Currently on room air and his O2 saturation is 100%. He started one Lovenox 1 mg/kg subcutaneous twice a day. He is not having any fever or chills. Past Medical History:    History reviewed. No pertinent past medical history. Past Surgical History:    History reviewed. No pertinent surgical history. Social History:     reports that he has quit smoking. His smoking use included cigars. He has never used smokeless tobacco. He reports previous drug use. Drug: Marijuana [place]). He reports that he does not drink alcohol. Family History:   No family history on file. Allergies:  Patient has no known allergies.         MEDICATIONS during current hospitalization:    Continuous Infusions:   sodium chloride         Scheduled Meds:   enoxaparin  1 mg/kg SubCUTAneous BID    sodium chloride flush  5-40 mL IntraVENous 2 times per day    levofloxacin  750 mg IntraVENous Q24H       PRN Meds:sodium chloride flush, sodium chloride, ondansetron **OR** ondansetron, polyethylene glycol, acetaminophen **OR** acetaminophen, Multidetector CT imaging was performed through the lungs in the supine position following the administration of intravenous contrast according to the CTPA protocol. Additional maximum intensity projection images were performed. All CT scans at this facility use dose modulation, iterative reconstruction, and/or weight based dosing when appropriate to reduce radiation dose to as low as reasonably achievable. CONTRAST: 100 mL of Isovue-370 FINDINGS: This is a good quality study for the evaluation of pulmonary embolism. PULMONARY ARTERIES & 3D RECONSTRUCTIONS: Normal in caliber. There is a medium to large caliber filling defect in the left lower lobar pulmonary artery extending distally. HEART: The heart is normal in size. No significant pericardial effusion. THORACIC AORTA: Normal caliber and contour. MEDIASTINUM AND SANDEEP: No pathologically enlarged lymph nodes. CHEST WALL AND AXILLA: Unremarkable. PLEURA: No pleural effusions or pneumothorax. AIRWAYS & LUNGS: The central airways are patent. There are bilateral consolidation in the left upper lobe, medial right upper lobe and the bilateral lower lobes, however predominantly affecting the left upper lobe. There is relative sparing of the right middle lobe. UPPER ABDOMEN: Unremarkable. MUSCULOSKELETAL: No suspicious osteolytic or osteoblastic lesions. 1.Left lower lobar pulmonary embolism with extension distally. 2.Bilateral parenchymal opacities is suggestive of multifocal pneumonia, including viral etiologies. Critical results were communicated to Dr. Talha Diaz on 11/19/2021 at 7:32 AM.    XR CHEST PORTABLE    Result Date: 11/14/2021  Exam: XR CHEST PORTABLE History: Shortness breath. Cough. Technique: AP portable view of the chest obtained. Comparison: Chest x-rays November 27, 2007 Findings: The cardiomediastinal silhouette is within normal limits. Small left hilar opacity. No pneumothorax or pleural effusion. Bones of the thorax appear intact.      Small left hilar opacity likely represents pneumonia however continued follow-up is recommended to ensure resolution. Assessment and  plan:     1. COVID-19 infection with bilateral pneumonia  2. Acute LLL Pulmonary Embolism secondary to COVID-19 with left-sided pleuritic pain. Patient is on room air O2 sats 100%. Patient has rapid Covid testing is negative but PCR panel with COVID-19 came positive. CT chest showing left lower lobe pulmonary embolism and left-sided pain is likely pleuritic due to PE. Continue Lovenox 1 mg/kg subcutaneous twice a day. Chest x-ray and CT chest reviewed. Agree with daily labs include CBC, CMP, LDH, ferritin and procalcitonin. Incentive spirometer. ID is consulted. Empirically started on Levaquin 750 mg IV every 24 hourly. Check procalcitonin. We'll follow    Thank you for consult  NOTE: This report was transcribed using voice recognition software. Every effort was made to ensure accuracy; however, inadvertent computerized transcription errors may be present.     Electronically signed by Lashon Juan MD, FCCP on 11/20/2021 at 4:41 PM

## 2021-11-20 NOTE — PROGRESS NOTES
Progress Note  Date:2021       [facility]  Patient Name:Speedy Moon     YOB: 2000     Age:21 y.o. Subjective    Subjective   No morning lab results by 1430hrs (more than 8 hours after they should have been drawn). Called lab, samples \"not collected\", no reason available. Directed lab to have collection done immediately, please. Saturating well on room air overnight. Review of Systems   Feels generally well. No dyspnea. Rare intermittent left pleuritic rib pain near known site of PE. Objective         Vitals Last 24 Hours:  TEMPERATURE:  Temp  Av.2 °F (36.8 °C)  Min: 97.9 °F (36.6 °C)  Max: 98.8 °F (37.1 °C)  RESPIRATIONS RANGE: Resp  Av.4  Min: 12  Max: 23  PULSE OXIMETRY RANGE: SpO2  Av %  Min: 98 %  Max: 100 %  PULSE RANGE: Pulse  Av.1  Min: 69  Max: 96  BLOOD PRESSURE RANGE: Systolic (19JQK), EJK:658 , Min:92 , QPL:877   ; Diastolic (86FFD), JRA:64, Min:57, Max:79    I/O (24Hr): No intake or output data in the 24 hours ending 21 7411    Physical Exam:  Constitutional: Eligah Jorge adult male reclining in bed in NAD. Head: NCAT  Eyes: PERRLA, EOMI.  ENT: Hearing grossly intact. No rhinorrhea or epistaxis. Neck: Trachea midline, phonation normal.  Cardiovascular: RRR, no murmurs appreciated. Warm and well perfused peripherally. No pretibial edema. Pulmonary: Normal rate and effort of respiration on room air. Still slightly diminished lung sounds in LML, LLL, RML. Other lung fields grossly CTAB. No rhonchi, rales, wheezing appreciated. No coughing during exam.  Abdomen: Soft, nontense, nondistended abdomen. Hypoactive bowel sounds. Mild generalized nausea exacerbation with palpation but no focal pain. Neurologic: Grossly alert and oriented. No focal neurologic deficits appreciated. Psychiatric: Pleasant, calm, cooperative.         Labs/Imaging/Diagnostics    Labs:  CBC:Recent Labs     21  0837   WBC 12.0*   RBC 5.34   HGB 12.8*   HCT 40.8*   MCV 76.3*   RDW 13.7        CHEMISTRIES:  Recent Labs     11/19/21  0600 11/19/21  0610 11/19/21 0806     --   --    K 3.4  --   --    CL 99  --   --    CO2 25  --   --    BUN 6  --   --    CREATININE 1.03 1.1  --    GLUCOSE 126*  --   --    MG  --   --  2.5*     PT/INR:  Recent Labs     11/19/21 0806   PROTIME 15.3*   INR 1.2     APTT:  Recent Labs     11/19/21 0806   APTT 34.9     LIVER PROFILE:  Recent Labs     11/19/21 0600   AST 28   ALT 39   BILITOT 1.1*   ALKPHOS 61       Imaging Last 24 Hours:  CTA Chest W WO  (PE study)    Result Date: 11/19/2021  EXAM: CTA CHEST W WO CONTRAST INDICATION:  left lower chest pain and pleurisy COMPARISON: None. TECHNIQUE: Multidetector CT imaging was performed through the lungs in the supine position following the administration of intravenous contrast according to the CTPA protocol. Additional maximum intensity projection images were performed. All CT scans at this facility use dose modulation, iterative reconstruction, and/or weight based dosing when appropriate to reduce radiation dose to as low as reasonably achievable. CONTRAST: 100 mL of Isovue-370 FINDINGS: This is a good quality study for the evaluation of pulmonary embolism. PULMONARY ARTERIES & 3D RECONSTRUCTIONS: Normal in caliber. There is a medium to large caliber filling defect in the left lower lobar pulmonary artery extending distally. HEART: The heart is normal in size. No significant pericardial effusion. THORACIC AORTA: Normal caliber and contour. MEDIASTINUM AND SANDEEP: No pathologically enlarged lymph nodes. CHEST WALL AND AXILLA: Unremarkable. PLEURA: No pleural effusions or pneumothorax. AIRWAYS & LUNGS: The central airways are patent. There are bilateral consolidation in the left upper lobe, medial right upper lobe and the bilateral lower lobes, however predominantly affecting the left upper lobe. There is relative sparing of the right middle lobe.  UPPER ABDOMEN: Unremarkable. MUSCULOSKELETAL: No suspicious osteolytic or osteoblastic lesions. 1.Left lower lobar pulmonary embolism with extension distally. 2.Bilateral parenchymal opacities is suggestive of multifocal pneumonia, including viral etiologies. Critical results were communicated to Dr. Ella Bhat on 11/19/2021 at 7:32 AM.    Assessment//Plan           Hospital Problems           Last Modified POA    Pneumonia 11/19/2021 Yes        Assessment & Plan    Active Problems:  #COVID-19 pneumonia without hypoxia (negative initial antigen screening test, followed by positive PCR test 11/19/2021)  #Acute LLL Pulmonary Embolism in setting of above    Plan:  -Continue telemetry  -Covid precautions  -not requiring oxygen at present. No indication for Decadron/Remdesivir at present. -ID consulted, appreciate recommendations  -Pulmonology consulted, appreciate recommendations, especially with regards to appropriate anticoagulation regimen for acute pulmonary embolism in setting of COVID-19 and otherwise healthy young adult.   -Treatment dose Lovenox at 1mg/kg BID for PE at present, pending recommendations  -Blood cultures from ED: Pending, NGTD  -Sputum culture and gram stain -pending, NGTD  -Daily labs to include CBC, CMP, LDH, Ferritin, Procal  -Incentive spirometry  -Adult regular diet    Electronically signed by Calla Primrose, DO on 11/20/21 at 8:07 AM EST

## 2021-11-21 ENCOUNTER — APPOINTMENT (OUTPATIENT)
Dept: ULTRASOUND IMAGING | Age: 21
DRG: 137 | End: 2021-11-21
Payer: MEDICAID

## 2021-11-21 LAB
ALBUMIN SERPL-MCNC: 3.1 G/DL (ref 3.5–4.6)
ALP BLD-CCNC: 57 U/L (ref 35–104)
ALT SERPL-CCNC: 34 U/L (ref 0–41)
ANION GAP SERPL CALCULATED.3IONS-SCNC: 9 MEQ/L (ref 9–15)
AST SERPL-CCNC: 28 U/L (ref 0–40)
BASOPHILS ABSOLUTE: 0.1 K/UL (ref 0–0.2)
BASOPHILS RELATIVE PERCENT: 1 %
BILIRUB SERPL-MCNC: 0.4 MG/DL (ref 0.2–0.7)
BUN BLDV-MCNC: 10 MG/DL (ref 6–20)
CALCIUM SERPL-MCNC: 8.6 MG/DL (ref 8.5–9.9)
CHLORIDE BLD-SCNC: 103 MEQ/L (ref 95–107)
CO2: 29 MEQ/L (ref 20–31)
CREAT SERPL-MCNC: 1.11 MG/DL (ref 0.7–1.2)
EOSINOPHILS ABSOLUTE: 0.1 K/UL (ref 0–0.7)
EOSINOPHILS RELATIVE PERCENT: 0.8 %
FERRITIN: 440.3 NG/ML (ref 30–400)
GFR AFRICAN AMERICAN: >60
GFR NON-AFRICAN AMERICAN: >60
GLOBULIN: 3.4 G/DL (ref 2.3–3.5)
GLUCOSE BLD-MCNC: 88 MG/DL (ref 70–99)
HCT VFR BLD CALC: 39.9 % (ref 42–52)
HEMOGLOBIN: 12.6 G/DL (ref 14–18)
LACTATE DEHYDROGENASE: 219 U/L (ref 135–225)
LYMPHOCYTES ABSOLUTE: 1.5 K/UL (ref 1–4.8)
LYMPHOCYTES RELATIVE PERCENT: 18 %
MCH RBC QN AUTO: 24 PG (ref 27–31.3)
MCHC RBC AUTO-ENTMCNC: 31.5 % (ref 33–37)
MCV RBC AUTO: 76.4 FL (ref 80–100)
MONOCYTES ABSOLUTE: 1.3 K/UL (ref 0.2–0.8)
MONOCYTES RELATIVE PERCENT: 16.3 %
NEUTROPHILS ABSOLUTE: 5.2 K/UL (ref 1.4–6.5)
NEUTROPHILS RELATIVE PERCENT: 63.9 %
PDW BLD-RTO: 13.6 % (ref 11.5–14.5)
PLATELET # BLD: 443 K/UL (ref 130–400)
POTASSIUM REFLEX MAGNESIUM: 3.7 MEQ/L (ref 3.4–4.9)
PROCALCITONIN: 0.02 NG/ML (ref 0–0.15)
RBC # BLD: 5.22 M/UL (ref 4.7–6.1)
SODIUM BLD-SCNC: 141 MEQ/L (ref 135–144)
TOTAL PROTEIN: 6.5 G/DL (ref 6.3–8)
WBC # BLD: 8.1 K/UL (ref 4.5–11)

## 2021-11-21 PROCEDURE — 93970 EXTREMITY STUDY: CPT

## 2021-11-21 PROCEDURE — 2060000000 HC ICU INTERMEDIATE R&B

## 2021-11-21 PROCEDURE — 6370000000 HC RX 637 (ALT 250 FOR IP): Performed by: INTERNAL MEDICINE

## 2021-11-21 PROCEDURE — 82728 ASSAY OF FERRITIN: CPT

## 2021-11-21 PROCEDURE — 2580000003 HC RX 258: Performed by: INTERNAL MEDICINE

## 2021-11-21 PROCEDURE — 6370000000 HC RX 637 (ALT 250 FOR IP): Performed by: NURSE PRACTITIONER

## 2021-11-21 PROCEDURE — 84145 PROCALCITONIN (PCT): CPT

## 2021-11-21 PROCEDURE — 6360000002 HC RX W HCPCS: Performed by: NURSE PRACTITIONER

## 2021-11-21 PROCEDURE — 80053 COMPREHEN METABOLIC PANEL: CPT

## 2021-11-21 PROCEDURE — 85025 COMPLETE CBC W/AUTO DIFF WBC: CPT

## 2021-11-21 PROCEDURE — 6360000002 HC RX W HCPCS: Performed by: INTERNAL MEDICINE

## 2021-11-21 PROCEDURE — 83615 LACTATE (LD) (LDH) ENZYME: CPT

## 2021-11-21 PROCEDURE — 36415 COLL VENOUS BLD VENIPUNCTURE: CPT

## 2021-11-21 RX ORDER — KETOROLAC TROMETHAMINE 30 MG/ML
15 INJECTION, SOLUTION INTRAMUSCULAR; INTRAVENOUS EVERY 6 HOURS PRN
Status: DISCONTINUED | OUTPATIENT
Start: 2021-11-21 | End: 2021-11-22 | Stop reason: HOSPADM

## 2021-11-21 RX ADMIN — APIXABAN 10 MG: 5 TABLET, FILM COATED ORAL at 15:37

## 2021-11-21 RX ADMIN — ACETAMINOPHEN 650 MG: 325 TABLET ORAL at 04:26

## 2021-11-21 RX ADMIN — LEVOFLOXACIN 750 MG: 5 INJECTION, SOLUTION INTRAVENOUS at 11:32

## 2021-11-21 RX ADMIN — ENOXAPARIN SODIUM 100 MG: 100 INJECTION SUBCUTANEOUS at 11:32

## 2021-11-21 RX ADMIN — APIXABAN 10 MG: 5 TABLET, FILM COATED ORAL at 20:26

## 2021-11-21 RX ADMIN — KETOROLAC TROMETHAMINE 15 MG: 30 INJECTION, SOLUTION INTRAMUSCULAR; INTRAVENOUS at 20:33

## 2021-11-21 RX ADMIN — KETOROLAC TROMETHAMINE 15 MG: 30 INJECTION, SOLUTION INTRAMUSCULAR; INTRAVENOUS at 11:32

## 2021-11-21 RX ADMIN — KETOROLAC TROMETHAMINE 15 MG: 30 INJECTION, SOLUTION INTRAMUSCULAR; INTRAVENOUS at 04:56

## 2021-11-21 RX ADMIN — Medication 10 ML: at 20:26

## 2021-11-21 RX ADMIN — Medication 10 ML: at 11:32

## 2021-11-21 ASSESSMENT — PAIN DESCRIPTION - DESCRIPTORS: DESCRIPTORS: ACHING

## 2021-11-21 ASSESSMENT — PAIN SCALES - GENERAL
PAINLEVEL_OUTOF10: 9
PAINLEVEL_OUTOF10: 4
PAINLEVEL_OUTOF10: 4
PAINLEVEL_OUTOF10: 9
PAINLEVEL_OUTOF10: 4

## 2021-11-21 ASSESSMENT — PAIN DESCRIPTION - ORIENTATION: ORIENTATION: LEFT

## 2021-11-21 ASSESSMENT — PAIN DESCRIPTION - LOCATION: LOCATION: ABDOMEN

## 2021-11-21 ASSESSMENT — PAIN DESCRIPTION - DIRECTION: RADIATING_TOWARDS: LEFT FLANK

## 2021-11-21 ASSESSMENT — PAIN DESCRIPTION - FREQUENCY: FREQUENCY: CONTINUOUS

## 2021-11-21 ASSESSMENT — PAIN DESCRIPTION - PAIN TYPE: TYPE: ACUTE PAIN

## 2021-11-21 NOTE — PROGRESS NOTES
Progress Note  Date:2021       Room:[place] [id number]  Patient Name:Speedy Mccabe     YOB: 2000     Age:21 y.o. Subjective    Subjective   No acute events. Saturating well on room air overnight. Review of Systems   Feels generally well. No dyspnea. Persistent intermittent left pleuritic rib pain near known site of PE. Objective         Vitals Last 24 Hours:  TEMPERATURE:  Temp  Av.2 °F (36.8 °C)  Min: 97.7 °F (36.5 °C)  Max: 98.8 °F (37.1 °C)  RESPIRATIONS RANGE: Resp  Av.5  Min: 16  Max: 18  PULSE OXIMETRY RANGE: SpO2  Av.8 %  Min: 99 %  Max: 100 %  PULSE RANGE: Pulse  Av  Min: 57  Max: 87  BLOOD PRESSURE RANGE: Systolic (54ZQN), UIU:819 , Min:100 , SQE:139   ; Diastolic (37IUD), CFQ:93, Min:50, Max:63    I/O (24Hr): No intake or output data in the 24 hours ending 21 1309    Physical Exam:  Constitutional: Young adult male reclining in bed in [facility]. Head: NCAT  Eyes: PERRLA, EOMI.  ENT: Hearing grossly intact. No rhinorrhea or epistaxis. Neck: Trachea midline, phonation normal.  Cardiovascular: RRR, no murmurs appreciated. Warm and well perfused peripherally. No pretibial edema. Pulmonary: Normal rate and effort of respiration on room air. Still slightly diminished lung sounds in LML, LLL, RML. Other lung fields grossly CTAB. No rhonchi, rales, wheezing appreciated. No coughing during exam.  Abdomen: Soft, nontense, nondistended abdomen. Hypoactive bowel sounds. Mild generalized nausea exacerbation with palpation but no focal pain. Neurologic: Grossly alert and oriented. No focal neurologic deficits appreciated. Psychiatric: Pleasant, calm, cooperative.         Labs/Imaging/Diagnostics    Labs:  CBC:  Recent Labs     21  0837 21  1406 21  0627   WBC 12.0* 9.5 8.1   RBC 5.34 5.33 5.22   HGB 12.8* 12.9* 12.6*   HCT 40.8* 40.2* 39.9*   MCV 76.3* 75.4* 76.4*   RDW 13.7 13.7 13.6    438* 443*     CHEMISTRIES:  Recent Labs     11/19/21  0600 11/19/21  0610 11/19/21  0806 11/20/21  1406 11/21/21 0627     --   --  140 141   K 3.4  --   --  3.5 3.7   CL 99  --   --  100 103   CO2 25  --   --  29 29   BUN 6  --   --  9 10   CREATININE 1.03 1.1  --  1.05 1.11   GLUCOSE 126*  --   --  93 88   MG  --   --  2.5* 2.2  --      PT/INR:  Recent Labs     11/19/21 0806   PROTIME 15.3*   INR 1.2     APTT:  Recent Labs     11/19/21 0806   APTT 34.9     LIVER PROFILE:  Recent Labs     11/19/21  0600 11/20/21  1406 11/21/21 0627   AST 28 21 28   ALT 39 32 34   BILITOT 1.1* 0.5 0.4   ALKPHOS 61 50 57       Imaging Last 24 Hours:  CTA Chest W WO  (PE study)    Result Date: 11/19/2021  EXAM: CTA CHEST W WO CONTRAST INDICATION:  left lower chest pain and pleurisy COMPARISON: None. TECHNIQUE: Multidetector CT imaging was performed through the lungs in the supine position following the administration of intravenous contrast according to the CTPA protocol. Additional maximum intensity projection images were performed. All CT scans at this facility use dose modulation, iterative reconstruction, and/or weight based dosing when appropriate to reduce radiation dose to as low as reasonably achievable. CONTRAST: 100 mL of Isovue-370 FINDINGS: This is a good quality study for the evaluation of pulmonary embolism. PULMONARY ARTERIES & 3D RECONSTRUCTIONS: Normal in caliber. There is a medium to large caliber filling defect in the left lower lobar pulmonary artery extending distally. HEART: The heart is normal in size. No significant pericardial effusion. THORACIC AORTA: Normal caliber and contour. MEDIASTINUM AND SANDEEP: No pathologically enlarged lymph nodes. CHEST WALL AND AXILLA: Unremarkable. PLEURA: No pleural effusions or pneumothorax. AIRWAYS & LUNGS: The central airways are patent. There are bilateral consolidation in the left upper lobe, medial right upper lobe and the bilateral lower lobes, however predominantly affecting the left upper lobe. There is relative sparing of the right middle lobe. UPPER ABDOMEN: Unremarkable. MUSCULOSKELETAL: No suspicious osteolytic or osteoblastic lesions. 1.Left lower lobar pulmonary embolism with extension distally. 2.Bilateral parenchymal opacities is suggestive of multifocal pneumonia, including viral etiologies. Critical results were communicated to Dr. Garrett Perkins on 11/19/2021 at 7:32 AM.    Assessment//Plan           Hospital Problems           Last Modified POA    Pneumonia 11/19/2021 Yes        Assessment & Plan    Active Problems:  #COVID-19 pneumonia without hypoxia (negative initial antigen screening test, followed by positive PCR test 11/19/2021)  #Acute LLL Pulmonary Embolism in setting of above    Plan:  -Continue telemetry  -Covid precautions  -not requiring oxygen at present. No indication for Decadron/Remdesivir at present. -ID consulted, appreciate recommendations  -Pulmonology consulted, recommend 3 months of full-dose Eliquis.  No follow up CT required unless worsening.    -Transition to Eliquis 10mg PO BID for 7 days starting 11/21; followed by 5mg PO BID starting 11/28 for next 3 months.    -Blood cultures from ED: Pending, NGTD  -Sputum culture and gram stain -pending, NGTD  -Daily labs to include CBC, CMP, LDH, Ferritin, Procal  -Incentive spirometry  -Adult regular diet    Electronically signed by Cynthia Sutherland DO on 11/20/21 at 8:07 AM EST

## 2021-11-22 VITALS
OXYGEN SATURATION: 100 % | HEART RATE: 67 BPM | HEIGHT: 66 IN | BODY MASS INDEX: 35.36 KG/M2 | SYSTOLIC BLOOD PRESSURE: 125 MMHG | RESPIRATION RATE: 16 BRPM | WEIGHT: 220 LBS | DIASTOLIC BLOOD PRESSURE: 64 MMHG | TEMPERATURE: 98 F

## 2021-11-22 LAB
ALBUMIN SERPL-MCNC: 2.8 G/DL (ref 3.5–4.6)
ALP BLD-CCNC: 52 U/L (ref 35–104)
ALT SERPL-CCNC: 36 U/L (ref 0–41)
ANION GAP SERPL CALCULATED.3IONS-SCNC: 10 MEQ/L (ref 9–15)
AST SERPL-CCNC: 22 U/L (ref 0–40)
BASOPHILS ABSOLUTE: 0.1 K/UL (ref 0–0.2)
BASOPHILS RELATIVE PERCENT: 1.1 %
BILIRUB SERPL-MCNC: 0.4 MG/DL (ref 0.2–0.7)
BUN BLDV-MCNC: 8 MG/DL (ref 6–20)
CALCIUM SERPL-MCNC: 8.9 MG/DL (ref 8.5–9.9)
CHLORIDE BLD-SCNC: 103 MEQ/L (ref 95–107)
CO2: 28 MEQ/L (ref 20–31)
CREAT SERPL-MCNC: 1.17 MG/DL (ref 0.7–1.2)
EOSINOPHILS ABSOLUTE: 0.1 K/UL (ref 0–0.7)
EOSINOPHILS RELATIVE PERCENT: 2.1 %
FERRITIN: 394.5 NG/ML (ref 30–400)
GFR AFRICAN AMERICAN: >60
GFR NON-AFRICAN AMERICAN: >60
GLOBULIN: 3.5 G/DL (ref 2.3–3.5)
GLUCOSE BLD-MCNC: 88 MG/DL (ref 70–99)
HCT VFR BLD CALC: 39.8 % (ref 42–52)
HEMOGLOBIN: 12.7 G/DL (ref 14–18)
LACTATE DEHYDROGENASE: 169 U/L (ref 135–225)
LYMPHOCYTES ABSOLUTE: 1.5 K/UL (ref 1–4.8)
LYMPHOCYTES RELATIVE PERCENT: 22.4 %
MCH RBC QN AUTO: 24.2 PG (ref 27–31.3)
MCHC RBC AUTO-ENTMCNC: 31.8 % (ref 33–37)
MCV RBC AUTO: 76.1 FL (ref 80–100)
MONOCYTES ABSOLUTE: 1 K/UL (ref 0.2–0.8)
MONOCYTES RELATIVE PERCENT: 14.4 %
NEUTROPHILS ABSOLUTE: 4.1 K/UL (ref 1.4–6.5)
NEUTROPHILS RELATIVE PERCENT: 60 %
PDW BLD-RTO: 13.7 % (ref 11.5–14.5)
PLATELET # BLD: 455 K/UL (ref 130–400)
POTASSIUM REFLEX MAGNESIUM: 3.8 MEQ/L (ref 3.4–4.9)
PROCALCITONIN: 0.05 NG/ML (ref 0–0.15)
RBC # BLD: 5.23 M/UL (ref 4.7–6.1)
SODIUM BLD-SCNC: 141 MEQ/L (ref 135–144)
TOTAL PROTEIN: 6.3 G/DL (ref 6.3–8)
WBC # BLD: 6.9 K/UL (ref 4.5–11)

## 2021-11-22 PROCEDURE — 2580000003 HC RX 258: Performed by: INTERNAL MEDICINE

## 2021-11-22 PROCEDURE — 82728 ASSAY OF FERRITIN: CPT

## 2021-11-22 PROCEDURE — 83615 LACTATE (LD) (LDH) ENZYME: CPT

## 2021-11-22 PROCEDURE — 6370000000 HC RX 637 (ALT 250 FOR IP): Performed by: INTERNAL MEDICINE

## 2021-11-22 PROCEDURE — 84145 PROCALCITONIN (PCT): CPT

## 2021-11-22 PROCEDURE — 80053 COMPREHEN METABOLIC PANEL: CPT

## 2021-11-22 PROCEDURE — 6360000002 HC RX W HCPCS: Performed by: INTERNAL MEDICINE

## 2021-11-22 PROCEDURE — 99232 SBSQ HOSP IP/OBS MODERATE 35: CPT | Performed by: INTERNAL MEDICINE

## 2021-11-22 PROCEDURE — 6360000002 HC RX W HCPCS: Performed by: NURSE PRACTITIONER

## 2021-11-22 PROCEDURE — 36415 COLL VENOUS BLD VENIPUNCTURE: CPT

## 2021-11-22 PROCEDURE — 85025 COMPLETE CBC W/AUTO DIFF WBC: CPT

## 2021-11-22 RX ORDER — LEVOFLOXACIN 500 MG/1
500 TABLET, FILM COATED ORAL DAILY
Qty: 10 TABLET | Refills: 0 | Status: SHIPPED | OUTPATIENT
Start: 2021-11-22 | End: 2021-12-02

## 2021-11-22 RX ADMIN — Medication 10 ML: at 10:53

## 2021-11-22 RX ADMIN — KETOROLAC TROMETHAMINE 15 MG: 30 INJECTION, SOLUTION INTRAMUSCULAR; INTRAVENOUS at 08:44

## 2021-11-22 RX ADMIN — LEVOFLOXACIN 750 MG: 5 INJECTION, SOLUTION INTRAVENOUS at 08:44

## 2021-11-22 RX ADMIN — KETOROLAC TROMETHAMINE 15 MG: 30 INJECTION, SOLUTION INTRAMUSCULAR; INTRAVENOUS at 15:13

## 2021-11-22 RX ADMIN — APIXABAN 10 MG: 5 TABLET, FILM COATED ORAL at 08:44

## 2021-11-22 ASSESSMENT — ENCOUNTER SYMPTOMS
COUGH: 1
SHORTNESS OF BREATH: 0
GASTROINTESTINAL NEGATIVE: 1

## 2021-11-22 ASSESSMENT — PAIN DESCRIPTION - ORIENTATION: ORIENTATION: LEFT;LOWER

## 2021-11-22 ASSESSMENT — PAIN SCALES - GENERAL
PAINLEVEL_OUTOF10: 3
PAINLEVEL_OUTOF10: 9

## 2021-11-22 ASSESSMENT — PAIN DESCRIPTION - LOCATION: LOCATION: BACK

## 2021-11-22 ASSESSMENT — PAIN DESCRIPTION - FREQUENCY: FREQUENCY: CONTINUOUS

## 2021-11-22 NOTE — PROGRESS NOTES
CLINICAL PHARMACY NOTE: MEDS TO BEDS    Total # of Prescriptions Filled: 2   The following medications were delivered to the patient:  · Levofloxacin 500mg tab  · Eliquis starter pack tab    Additional Documentation:

## 2021-11-22 NOTE — CARE COORDINATION
meds to bed obtained from [facility] pharmacy. Will deliver to nurse to give to patient. Patient to be discharged home later today. Number provided to patient for Eliquis discount for future refills. He will need to call this number as discount $10 program is only for those with insurance. Patient planning to d/c later this evening.

## 2021-11-22 NOTE — PROGRESS NOTES
Infectious Disease     Patient Name: Liang Lr  Date: 11/22/2021  YOB: 2000  Medical Record Number: [id number]              COVID-19  Left lower lobe pneumonia    Continuescough no chest pain no sputum production    Patient satting 100% on room air            Review of Systems   Constitutional: Negative. Respiratory: Positive for cough. Negative for shortness of breath. Cardiovascular: Negative. Gastrointestinal: Negative. Genitourinary: Negative. Musculoskeletal: Negative. Skin: Negative. Review of Systems: All 14 review of systems negative other than as stated above    Social History     Tobacco Use    Smoking status: Former Smoker     Types: Cigars    Smokeless tobacco: Never Used   Vaping Use    Vaping Use: Never used   Substance Use Topics    Alcohol use: No    Drug use: Not Currently     Types: Marijuana Charmayne Stai)     Comment: not on a regular basis, quit 2 months ago         History reviewed. No pertinent past medical history. History reviewed. No pertinent surgical history. No current facility-administered medications on file prior to encounter. Current Outpatient Medications on File Prior to Encounter   Medication Sig Dispense Refill    [DISCONTINUED] famotidine (PEPCID) 20 MG tablet Take 1 tablet by mouth 2 times daily 20 tablet 0    [DISCONTINUED] ibuprofen (IBU) 400 MG tablet Take 1 tablet by mouth every 6 hours as needed for Pain 120 tablet 0         Physical Exam:      Physical Exam  Constitutional:       Appearance: He is not ill-appearing. Cardiovascular:      Heart sounds: Normal heart sounds. No murmur heard. Pulmonary:      Effort: Pulmonary effort is normal. No respiratory distress. Breath sounds: Normal breath sounds. No stridor. No wheezing or rales. Abdominal:      General: Abdomen is flat. Bowel sounds are normal. There is no distension. Palpations: Abdomen is soft. There is no mass. Tenderness:  There is no abdominal tenderness. There is no guarding. Hernia: No hernia is present. Blood pressure 125/64, pulse 67, temperature 98 °F (36.7 °C), temperature source Oral, resp. rate 16, height 5' 6\" (1.676 m), weight 220 lb (99.8 kg), SpO2 100 %. .   Lab Results   Component Value Date    WBC 6.9 11/22/2021    HGB 12.7 (L) 11/22/2021    HCT 39.8 (L) 11/22/2021    MCV 76.1 (L) 11/22/2021     (H) 11/22/2021     Lab Results   Component Value Date     11/22/2021    K 3.8 11/22/2021     11/22/2021    CO2 28 11/22/2021    BUN 8 11/22/2021    CREATININE 1.17 11/22/2021    GLUCOSE 88 11/22/2021    CALCIUM 8.9 11/22/2021                ASSESSMENT:  Patient Active Problem List   Diagnosis    Pneumonia         PLAN:  COVID-19 infection  Left lower lobe pneumonia  Currently on Levaquin    -Not hypoxic    Can discharge on p.o.  Levaquin for 10 days

## 2021-11-22 NOTE — PROGRESS NOTES
INPATIENT PROGRESS NOTES    PATIENT NAME: Ericka Hardin  MRN: [id number]  SERVICE DATE:  November 22, 2021   SERVICE TIME:  3:42 PM      PRIMARY SERVICE: Pulmonary Disease    CHIEF COMPLAIN: Pulmonary embolism      INTERVAL HPI: Patient seen and examined at bedside, Interval Notes, orders reviewed. Nursing notes noted  Patient has cough with bloody mucus today. He denies having short of breath he is on room air O2 saturation 100%. Chest pain is better. No fever. No nausea vomiting diarrhea abdominal pain. DVT study is negative. He is started on Eliquis 10 mg twice daily. OBJECTIVE    Body mass index is 35.51 kg/m². PHYSICAL EXAM:  Vitals:  /64   Pulse 67   Temp 98 °F (36.7 °C) (Oral)   Resp 16   Ht 5' 6\" (1.676 m)   Wt 220 lb (99.8 kg)   SpO2 100%   BMI 35.51 kg/m²   General:Alert, awake . comfortable in bed, No distress. Head: Atraumatic , Normocephalic   Eyes: PERRL. No sclera icterus. No conjunctival injection. No discharge   ENT: No nasal  discharge. Pharynx clear. Neck:  Trachea midline. No thyromegaly, no JVD, No cervical adenopathy. Chest : Bilaterally symmetrical ,Normal effort,  No accessory muscle use  Lung : . Fair BS bilateral, decreased BS at bases. Left base Rales. No wheezing. No rhonchi. Heart[de-identified] Normal  rate. Regular rhythm. No mumur ,  Rub or gallop  ABD: Non-tender. Non-distended. No masses. No organmegaly. Normal bowel sounds. No hernia.   Ext : No Pitting both leg , No Cyanosis No clubbing  Neuro: no focal weakness          DATA:   Recent Labs     11/21/21 0627 11/22/21 0604   WBC 8.1 6.9   HGB 12.6* 12.7*   HCT 39.9* 39.8*   MCV 76.4* 76.1*   * 455*     Recent Labs     11/21/21 0627 11/21/21 0627 11/22/21 0604     --  141   K 3.7  --  3.8     --  103   CO2 29  --  28   BUN 10  --  8   CREATININE 1.11  --  1.17   GLUCOSE 88  --  88   CALCIUM 8.6  --  8.9   PROT 6.5  --  6.3   LABALBU 3.1*  --  2.8*   BILITOT 0.4  --  0.4   ALKPHOS 57  --  52 AST 28  --  22   ALT 34   < > 36   LABGLOM >60.0   < > >60.0   GFRAA >60.0   < > >60.0   GLOB 3.4   < > 3.5    < > = values in this interval not displayed. MV Settings:          No results for input(s): PHART, QUK8DLF, PO2ART, LOM1QAU, BEART, B1UERFSM in the last 72 hours. O2 Device: None (Room air)    ADULT DIET; Regular     MEDICATIONS during current hospitalization:    Continuous Infusions:   sodium chloride         Scheduled Meds:   apixaban  10 mg Oral BID    [START ON 11/28/2021] apixaban  5 mg Oral BID    lidocaine  1 patch TransDERmal Daily    sodium chloride flush  5-40 mL IntraVENous 2 times per day    levofloxacin  750 mg IntraVENous Q24H       PRN Meds:ketorolac, sodium chloride flush, sodium chloride, ondansetron **OR** ondansetron, polyethylene glycol, acetaminophen **OR** acetaminophen, guaiFENesin-dextromethorphan    Radiology  CTA Chest W WO  (PE study)    Result Date: 11/19/2021  EXAM: CTA CHEST W WO CONTRAST INDICATION:  left lower chest pain and pleurisy COMPARISON: None. TECHNIQUE: Multidetector CT imaging was performed through the lungs in the supine position following the administration of intravenous contrast according to the CTPA protocol. Additional maximum intensity projection images were performed. All CT scans at this facility use dose modulation, iterative reconstruction, and/or weight based dosing when appropriate to reduce radiation dose to as low as reasonably achievable. CONTRAST: 100 mL of Isovue-370 FINDINGS: This is a good quality study for the evaluation of pulmonary embolism. PULMONARY ARTERIES & 3D RECONSTRUCTIONS: Normal in caliber. There is a medium to large caliber filling defect in the left lower lobar pulmonary artery extending distally. HEART: The heart is normal in size. No significant pericardial effusion. THORACIC AORTA: Normal caliber and contour. MEDIASTINUM AND SANDEEP: No pathologically enlarged lymph nodes. CHEST WALL AND AXILLA: Unremarkable. PLEURA: No pleural effusions or pneumothorax. AIRWAYS & LUNGS: The central airways are patent. There are bilateral consolidation in the left upper lobe, medial right upper lobe and the bilateral lower lobes, however predominantly affecting the left upper lobe. There is relative sparing of the right middle lobe. UPPER ABDOMEN: Unremarkable. MUSCULOSKELETAL: No suspicious osteolytic or osteoblastic lesions. 1.Left lower lobar pulmonary embolism with extension distally. 2.Bilateral parenchymal opacities is suggestive of multifocal pneumonia, including viral etiologies. Critical results were communicated to Dr. Racheal Thao on 11/19/2021 at 7:32 AM.    XR CHEST PORTABLE    Result Date: 11/14/2021  Exam: XR CHEST PORTABLE History: Shortness breath. Cough. Technique: AP portable view of the chest obtained. Comparison: Chest x-rays November 27, 2007 Findings: The cardiomediastinal silhouette is within normal limits. Small left hilar opacity. No pneumothorax or pleural effusion. Bones of the thorax appear intact. Small left hilar opacity likely represents pneumonia however continued follow-up is recommended to ensure resolution. US DUP LOWER EXTREMITIES BILATERAL VENOUS    Result Date: 11/21/2021  EXAMINATION: US DUP LOWER EXTREMITIES BILATERAL VENOUS CLINICAL HISTORY: ACUTE PULMONARY EMBOLISM. COVID 19 POSITIVE. RULE OUT DVT. FINDINGS: Bilateral compression, augmentation, and color flow demonstrated at bilateral common femoral veins, proximal, mid, and distal superficial femoral veins, and popliteal veins. Bilateral compression and color flow demonstrated within bilateral infrapopliteal venous vasculature. NO SONOGRAPHIC EVIDENCE, DEEP VEIN THROMBOSIS, BILATERAL LOWER EXTREMITIES. IMPRESSION AND SUGGESTION:    1. COVID-19 infection with bilateral pneumonia  2. Acute LLL Pulmonary Embolism secondary to COVID-19 with left-sided pleuritic pain. Patient is on room air O2 saturation 100%.   No complaint of shortness of breath. Patient was seen by ID okay to discharge home with pramon Gonzalez by ID. Patient is going home with p.o. Eliquis. Recommends anticoagulation therapy 3 months. DVT study is negative. Follow-up in office 4 weeks        NOTE: This report was transcribed using voice recognition software. Every effort was made to ensure accuracy; however, inadvertent computerized transcription errors may be present.       Electronically signed by Viki Hopper MD, FCCP on 11/22/2021 at 3:42 PM

## 2021-11-22 NOTE — DISCHARGE SUMMARY
Hospital Medicine Discharge Summary    Bell Ma  :  2000  MRN:  [id number]    Admit date:  2021  Discharge date:  2021    Admitting Physician:  Grady Lanes, DO  Primary Care Physician:  No primary care provider on file. Discharge Diagnoses:      COVID-19 pneumonia  Acute PE    Chief Complaint   Patient presents with    Shortness of [place] Course:       Patient is a 56-year-old who was admitted with COVID-19 pneumonia and acute PE. Patient is morbidly obese with a BMI of 35.51. He was treated with anticoagulation. Infectious disease and pulmonary medicine were also consulted. Patient was discharged on  after discussion with pulmonary medicine. Infectious disease also cleared the patient for discharge on p.o. Levaquin. Patient is instructed to come back to the hospital if he has any significant bleeding or worsening of his symptoms. They verbalized understanding. Exam on discharge:   /64   Pulse 67   Temp 98 °F (36.7 °C) (Oral)   Resp 16   Ht 5' 6\" (1.676 m)   Wt 220 lb (99.8 kg)   SpO2 100%   BMI 35.51 kg/m²   General appearance: No apparent distress, appears stated age and cooperative. HEENT: Pupils equal, round, and reactive to light. Conjunctivae/corneas clear. Neck: Supple, with full range of motion. No jugular venous distention. Trachea midline. Respiratory:  Normal respiratory effort. Clear to auscultation, bilaterally without Rales/Wheezes/Rhonchi. Cardiovascular: Regular rate and rhythm with normal S1/S2 without murmurs, rubs or gallops. Abdomen: Soft, non-tender, non-distended with normal bowel sounds. Musculoskeletal: No clubbing, cyanosis or edema bilaterally. Full range of motion without deformity. Skin: Skin color, texture, turgor normal.  No rashes or lesions. Neuro: Non Focal. Symetrical motor and tone. Nl Comprehension, Alert,awake and oriented. NL CN. Symetrical tone and reflexes.   Psychiatric: Alert and oriented, thought content appropriate, normal insight  Capillary Refill: Brisk,< 3 seconds   Peripheral Pulses: +2 palpable, equal bilaterally     Patient was seen by the following consultants   Consults:  IP CONSULT TO INFECTIOUS DISEASES  IP CONSULT TO PULMONOLOGY    Significant Diagnostic Studies:    Refer to chart     Please refer to chart if no studies are shown here    CTA Chest W WO  (PE study)    Result Date: 11/19/2021  EXAM: CTA CHEST W WO CONTRAST INDICATION:  left lower chest pain and pleurisy COMPARISON: None. TECHNIQUE: Multidetector CT imaging was performed through the lungs in the supine position following the administration of intravenous contrast according to the CTPA protocol. Additional maximum intensity projection images were performed. All CT scans at this facility use dose modulation, iterative reconstruction, and/or weight based dosing when appropriate to reduce radiation dose to as low as reasonably achievable. CONTRAST: 100 mL of Isovue-370 FINDINGS: This is a good quality study for the evaluation of pulmonary embolism. PULMONARY ARTERIES & 3D RECONSTRUCTIONS: Normal in caliber. There is a medium to large caliber filling defect in the left lower lobar pulmonary artery extending distally. HEART: The heart is normal in size. No significant pericardial effusion. THORACIC AORTA: Normal caliber and contour. MEDIASTINUM AND SANDEEP: No pathologically enlarged lymph nodes. CHEST WALL AND AXILLA: Unremarkable. PLEURA: No pleural effusions or pneumothorax. AIRWAYS & LUNGS: The central airways are patent. There are bilateral consolidation in the left upper lobe, medial right upper lobe and the bilateral lower lobes, however predominantly affecting the left upper lobe. There is relative sparing of the right middle lobe. UPPER ABDOMEN: Unremarkable. MUSCULOSKELETAL: No suspicious osteolytic or osteoblastic lesions. 1.Left lower lobar pulmonary embolism with extension distally.  2.Bilateral parenchymal opacities is suggestive of multifocal pneumonia, including viral etiologies. Critical results were communicated to Dr. Edgar Zambrano on 11/19/2021 at 7:32 AM.      Discharge Medications:         Medication List      START taking these medications    apixaban starter pack 5 MG Tbpk tablet  Commonly known as: Eliquis DVT/PE Starter Pack  Take 1 tablet by mouth See Admin Instructions     levoFLOXacin 500 MG tablet  Commonly known as: Levaquin  Take 1 tablet by mouth daily for 10 days        STOP taking these medications    famotidine 20 MG tablet  Commonly known as: Pepcid     ibuprofen 400 MG tablet  Commonly known as: IBU           Where to Get Your Medications      You can get these medications from any pharmacy    Bring a paper prescription for each of these medications  · apixaban starter pack 5 MG Tbpk tablet  · levoFLOXacin 500 MG tablet         Disposition:   If discharged to Home, Any [facility] needs that were indicated and/or required as been addressed and set up by Social Work. Condition at discharge: good     Activity: activity as tolerated    Total time taken for discharging this patient: 40 minutes. Greater than 70% of time was spent focused exclusively on this patient. Time was taken to review chart, discuss plans with consultants, reconciling medications, discussing plan answering questions with patient.      Devin Tabares DO  11/22/2021, 3:58 PM  ----------------------------------------------------------------------------------------------------------------------    Rufus Wilson

## 2021-11-24 LAB
BLOOD CULTURE, ROUTINE: NORMAL
CULTURE, BLOOD 2: NORMAL

## 2021-11-30 ENCOUNTER — VIRTUAL VISIT (OUTPATIENT)
Dept: FAMILY MEDICINE CLINIC | Age: 21
End: 2021-11-30
Payer: MEDICAID

## 2021-11-30 DIAGNOSIS — Z76.89 ENCOUNTER TO ESTABLISH CARE: ICD-10-CM

## 2021-11-30 DIAGNOSIS — U07.1 PNEUMONIA DUE TO COVID-19 VIRUS: ICD-10-CM

## 2021-11-30 DIAGNOSIS — J12.82 PNEUMONIA DUE TO COVID-19 VIRUS: ICD-10-CM

## 2021-11-30 DIAGNOSIS — I26.93 SINGLE SUBSEGMENTAL PULMONARY EMBOLISM WITHOUT ACUTE COR PULMONALE (HCC): ICD-10-CM

## 2021-11-30 DIAGNOSIS — Z09 HOSPITAL DISCHARGE FOLLOW-UP: Primary | ICD-10-CM

## 2021-11-30 PROCEDURE — 1111F DSCHRG MED/CURRENT MED MERGE: CPT | Performed by: STUDENT IN AN ORGANIZED HEALTH CARE EDUCATION/TRAINING PROGRAM

## 2021-11-30 PROCEDURE — 99204 OFFICE O/P NEW MOD 45 MIN: CPT | Performed by: STUDENT IN AN ORGANIZED HEALTH CARE EDUCATION/TRAINING PROGRAM

## 2021-11-30 SDOH — ECONOMIC STABILITY: FOOD INSECURITY: WITHIN THE PAST 12 MONTHS, THE FOOD YOU BOUGHT JUST DIDN'T LAST AND YOU DIDN'T HAVE MONEY TO GET MORE.: NEVER TRUE

## 2021-11-30 SDOH — ECONOMIC STABILITY: FOOD INSECURITY: WITHIN THE PAST 12 MONTHS, YOU WORRIED THAT YOUR FOOD WOULD RUN OUT BEFORE YOU GOT MONEY TO BUY MORE.: NEVER TRUE

## 2021-11-30 SDOH — ECONOMIC STABILITY: TRANSPORTATION INSECURITY
IN THE PAST 12 MONTHS, HAS THE LACK OF TRANSPORTATION KEPT YOU FROM MEDICAL APPOINTMENTS OR FROM GETTING MEDICATIONS?: NO

## 2021-11-30 SDOH — ECONOMIC STABILITY: TRANSPORTATION INSECURITY
IN THE PAST 12 MONTHS, HAS LACK OF TRANSPORTATION KEPT YOU FROM MEETINGS, WORK, OR FROM GETTING THINGS NEEDED FOR DAILY LIVING?: NO

## 2021-11-30 ASSESSMENT — PATIENT HEALTH QUESTIONNAIRE - PHQ9
SUM OF ALL RESPONSES TO PHQ9 QUESTIONS 1 & 2: 0
SUM OF ALL RESPONSES TO PHQ QUESTIONS 1-9: 0
SUM OF ALL RESPONSES TO PHQ QUESTIONS 1-9: 0
1. LITTLE INTEREST OR PLEASURE IN DOING THINGS: 0
SUM OF ALL RESPONSES TO PHQ QUESTIONS 1-9: 0
2. FEELING DOWN, DEPRESSED OR HOPELESS: 0

## 2021-11-30 ASSESSMENT — ENCOUNTER SYMPTOMS
COUGH: 0
WHEEZING: 0
SINUS PAIN: 0
SINUS PRESSURE: 0
SHORTNESS OF BREATH: 0
RHINORRHEA: 0
SORE THROAT: 0
DIARRHEA: 0
VOMITING: 0
NAUSEA: 0
ABDOMINAL PAIN: 0

## 2021-11-30 ASSESSMENT — SOCIAL DETERMINANTS OF HEALTH (SDOH): HOW HARD IS IT FOR YOU TO PAY FOR THE VERY BASICS LIKE FOOD, HOUSING, MEDICAL CARE, AND HEATING?: NOT HARD AT ALL

## 2021-11-30 NOTE — PROGRESS NOTES
Post-Discharge Transitional Care Management Services or Hospital Follow Up      Liang Lr   YOB: 2000    Date of Office Visit:  11/30/2021  Date of Hospital Admission: 11/19/21  Date of Hospital Discharge: 11/22/21  Readmission Risk Score(high >=14%. Medium >=10%):Readmission Risk Score: 7.9 ( )      Care management risk score Rising risk (score 2-5) and Complex Care (Scores >=6): 0     Non face to face  following discharge, date last encounter closed (first attempt may have been earlier): *No documented post hospital discharge outreach found in the last 14 days *No documented post hospital discharge outreach found in the last 14 days    Call initiated 2 business days of discharge: *No response recorded in the last 14 days     Patient Active Problem List   Diagnosis    Pneumonia       No Known Allergies    Medications listed as ordered at the time of discharge from hospital    Medications marked \"taking\" at this time  Outpatient Medications Marked as Taking for the 11/30/21 encounter (Virtual Visit) with Evgeny Wagner, DO   Medication Sig Dispense Refill    levoFLOXacin (LEVAQUIN) 500 MG tablet Take 1 tablet by mouth daily for 10 days 10 tablet 0    apixaban starter pack (ELIQUIS DVT/PE STARTER PACK) 5 MG TBPK tablet Take 1 tablet by mouth See Admin Instructions 74 tablet 0        Medications patient taking as of now reconciled against medications ordered at time of hospital discharge: Yes    Chief Complaint   Patient presents with   [address]     Did not have a prior PCP    [facility]     Was seen in the ER several times 11/11, 11/14 & 11/19. Was admitted on 11/19 with COVID pneumonia & PC. D/C home 11/22. States he is feeling well since going home. Has no concerns at this time. HPI    Inpatient course: Discharge summary reviewed- see chart. Interval history/Current status: Patient doing well since hospital stay. He was discharged on 11/22.   He was discharged on Levaquin and apixaban. He is tolerating both medications without difficulty. He denies any chest pain, shortness of breath or palpitations. He states he is feeling back to his usual self. He was diagnosed with COVID-19 pneumonia pulmonary embolism. No hemoptysis. No current fevers or chills. He states that he is recovering well at home. Patient also establishing care with new doctor. He has not had a PCP in quite some time. He denies any health concerns prior to being ill with Covid and pulmonary embolism. He denies any surgeries. He is not on any medications previously. He was not vaccinated for Covid. He is scheduled to see pulmonology next month. He stated that they should be the ones that we will be prescribing the apixaban going forward. He has no other concerns at this time. Review of Systems   Constitutional: Negative for chills, fatigue, fever and unexpected weight change. HENT: Negative for congestion, postnasal drip, rhinorrhea, sinus pressure, sinus pain and sore throat. Eyes: Negative for visual disturbance. Respiratory: Negative for cough, shortness of breath and wheezing. Cardiovascular: Negative for chest pain, palpitations and leg swelling. Gastrointestinal: Negative for abdominal pain, diarrhea, nausea and vomiting. Genitourinary: Negative for difficulty urinating. Musculoskeletal: Negative for arthralgias and joint swelling. Skin: Negative for rash. Neurological: Negative for weakness, light-headedness, numbness and headaches. Psychiatric/Behavioral: Negative for confusion. The patient is not nervous/anxious. There were no vitals filed for this visit. There is no height or weight on file to calculate BMI.    Wt Readings from Last 3 Encounters:   11/19/21 220 lb (99.8 kg)   11/13/21 220 lb (99.8 kg)   06/16/21 220 lb (99.8 kg)     BP Readings from Last 3 Encounters:   11/22/21 125/64   11/14/21 111/78   06/16/21 107/60       Physical Exam  Due to nature of virtual visit, unable to complete full physical exam at this time, patient does not sound short of breath while talking. He does not appear to be in respiratory distress. Assessment/Plan:  1. Hospital discharge follow-up  Patient with appointment for hospital discharge follow-up following COVID-19 pneumonia and bilateral pulmonary embolism diagnoses. He reports he is back to his usual state of health. He is taking all medications as prescribed. Medication reconciliation completed. He is only on the Levaquin and apixaban. Tolerating both well. - [place] DISCHARGE MEDS RECONCILED W/ CURRENT OUTPATIENT MED LIST    2. Encounter to establish care  Patient with appointment to establish care. Did not have a PCP previously. No health concerns at this time. We did discuss vaccinations. We will plan for follow-up in person in 6 to 8 weeks. 3. Pneumonia due to COVID-19 virus  Patient recovering well. He is on Levaquin. Not vaccinated. We did discuss vaccines after having Covid. We will plan to get it in the new year. 4. Single subsegmental pulmonary embolism without acute cor pulmonale (HCC)  Stable. Following with pulmonology. On apixaban. Not currently short of breath. Feeling well. Instructed to follow with pulmonology as scheduled. All questions answered. Medical Decision Making: moderate complexity    On this date 11/30/2021 I have spent 30 minutes reviewing previous notes, test results and face to face (virtual) with the patient discussing the diagnosis and importance of compliance with the treatment plan as well as documenting on the day of the visit. Mejia Velasquez, was evaluated through a synchronous (real-time) audio-video encounter. The patient (or guardian if applicable) is aware that this is a billable service. Verbal consent to proceed has been obtained within the past 12 months.  The visit was conducted pursuant to the emergency declaration under the [facility] Act and the [address] waiver authority and the [employer] and [employer] Act. Patient identification was verified, and a caregiver was present when appropriate. The patient was located in a state where the provider was credentialed to provide care. An electronic signature was used to authenticate this note.     --Gregorio Nicholson, DO

## 2021-12-01 ENCOUNTER — TELEPHONE (OUTPATIENT)
Dept: FAMILY MEDICINE CLINIC | Age: 21
End: 2021-12-01

## 2021-12-21 ENCOUNTER — TELEPHONE (OUTPATIENT)
Dept: FAMILY MEDICINE CLINIC | Age: 21
End: 2021-12-21

## 2021-12-21 DIAGNOSIS — I26.93 SINGLE SUBSEGMENTAL PULMONARY EMBOLISM WITHOUT ACUTE COR PULMONALE (HCC): Primary | ICD-10-CM

## 2021-12-21 NOTE — TELEPHONE ENCOUNTER
----- Message from Yanetchema Sheldon sent at 12/20/2021  9:55 AM EST -----  Subject: Message to Provider    QUESTIONS  Information for Provider? pt is needing Eliqis tablets, 5mg refilled. Takes 2x a day. Has 3 days of pills left. Said PCP told him to call to   request these if original Dr did not refill them. Please contact pt back   ---------------------------------------------------------------------------  --------------  CALL BACK INFO  What is the best way for the office to contact you? OK to leave message on   voicemail  Preferred Call Back Phone Number? [phone]  ---------------------------------------------------------------------------  --------------  SCRIPT ANSWERS  Relationship to Patient?  Self

## 2021-12-23 ENCOUNTER — TELEPHONE (OUTPATIENT)
Dept: FAMILY MEDICINE CLINIC | Age: 21
End: 2021-12-23

## 2021-12-23 NOTE — TELEPHONE ENCOUNTER
----- Message from Seferino Avendano sent at 12/22/2021  4:49 PM EST -----  Subject: Message to Provider    QUESTIONS  Information for Provider? Pt was given 360 support booklet w/card. Pharmacy [facility] is saying pt has the wrong booklet. Please call to   discuss what he needs and if he could he would like to pick it up. He is   out of his meds as of tomorrow. ---------------------------------------------------------------------------  --------------  Isidro Cleverly INFO  What is the best way for the office to contact you? OK to leave message on   voicemail  Preferred Call Back Phone Number? [phone]  ---------------------------------------------------------------------------  --------------  SCRIPT ANSWERS  Relationship to Patient?  Self

## 2021-12-30 DIAGNOSIS — I26.93 SINGLE SUBSEGMENTAL PULMONARY EMBOLISM WITHOUT ACUTE COR PULMONALE (HCC): ICD-10-CM

## 2021-12-30 NOTE — TELEPHONE ENCOUNTER
Nathaly Lynne [facility] Clinical Staff  Subject: Refill Request     QUESTIONS   Name of Medication? apixaban (ELIQUIS) 5 MG TABS tablet   Patient-reported dosage and instructions? 2X daily   How many days do you have left? 0   Preferred Pharmacy? [facility] phone number (if available)? [phone]   Additional Information for Provider? Patient wants to ask about getting   samples that you gave him before as he has no coverage now.   ---------------------------------------------------------------------------   --------------   CALL BACK INFO   What is the best way for the office to contact you? OK to leave message on   voicemail   Preferred Call Back Phone Number?  [phone]

## 2022-02-14 ENCOUNTER — TELEPHONE (OUTPATIENT)
Dept: FAMILY MEDICINE CLINIC | Age: 22
End: 2022-02-14

## 2022-02-14 DIAGNOSIS — I26.93 SINGLE SUBSEGMENTAL PULMONARY EMBOLISM WITHOUT ACUTE COR PULMONALE (HCC): Primary | ICD-10-CM

## 2022-02-14 NOTE — TELEPHONE ENCOUNTER
Sent to [facility]. Please instruct patient to keep appt with pulmonology on Wednesday to decide how long he should be on eliquis.  Thanks

## 2022-02-16 ENCOUNTER — HOSPITAL ENCOUNTER (OUTPATIENT)
Dept: GENERAL RADIOLOGY | Age: 22
Discharge: HOME | End: 2022-02-18
Payer: MEDICAID

## 2022-02-16 ENCOUNTER — OFFICE VISIT (OUTPATIENT)
Dept: PULMONOLOGY | Age: 22
End: 2022-02-16
Payer: MEDICAID

## 2022-02-16 VITALS
HEIGHT: 66 IN | BODY MASS INDEX: 38.41 KG/M2 | TEMPERATURE: 98.8 F | DIASTOLIC BLOOD PRESSURE: 76 MMHG | WEIGHT: 239 LBS | OXYGEN SATURATION: 99 % | SYSTOLIC BLOOD PRESSURE: 125 MMHG | HEART RATE: 79 BPM

## 2022-02-16 DIAGNOSIS — Z86.711 HISTORY OF PULMONARY EMBOLISM: ICD-10-CM

## 2022-02-16 DIAGNOSIS — J12.82 PNEUMONIA DUE TO COVID-19 VIRUS: Primary | ICD-10-CM

## 2022-02-16 DIAGNOSIS — U07.1 PNEUMONIA DUE TO COVID-19 VIRUS: ICD-10-CM

## 2022-02-16 DIAGNOSIS — J12.82 PNEUMONIA DUE TO COVID-19 VIRUS: ICD-10-CM

## 2022-02-16 DIAGNOSIS — U07.1 PNEUMONIA DUE TO COVID-19 VIRUS: Primary | ICD-10-CM

## 2022-02-16 PROCEDURE — 1111F DSCHRG MED/CURRENT MED MERGE: CPT | Performed by: INTERNAL MEDICINE

## 2022-02-16 PROCEDURE — 99214 OFFICE O/P EST MOD 30 MIN: CPT | Performed by: INTERNAL MEDICINE

## 2022-02-16 PROCEDURE — 71046 X-RAY EXAM CHEST 2 VIEWS: CPT

## 2022-02-16 ASSESSMENT — ENCOUNTER SYMPTOMS
RHINORRHEA: 0
WHEEZING: 0
NAUSEA: 0
VOICE CHANGE: 0
SHORTNESS OF BREATH: 0
EYE ITCHING: 0
SORE THROAT: 0
CHEST TIGHTNESS: 0
VOMITING: 0
COUGH: 0
DIARRHEA: 0
ABDOMINAL PAIN: 0

## 2022-02-16 NOTE — PROGRESS NOTES
Subjective:     Dre Neumann is a 25 y.o. male who complains today of:     Chief Complaint   Patient presents with    Follow-Up from Hospital     was suppose to be seen 12/21    Pneumonia       HPI  He was positive for covid and had PE in November 2021. He had Ct chest Left lower lobar pulmonary embolism with extension distally. Bilateral parenchymal opacities is suggestive of multifocal pneumonia, including viral etiologies. No DVT in lower extremity  No shortness of breath  No cough, No sputum  No wheezing  No chest pain or pleuritic pain  No fever or chills   No hemoptysis  No Nausea, Vomiting or Diarrhea  He is willing  To do CXR for f/u pneumonia  He is on Eliquis since he has PE 11/21    Allergies:  Patient has no known allergies. No past medical history on file. No past surgical history on file.   Family History   Problem Relation Age of Onset    No Known Problems Mother     No Known Problems Father      Social History     Socioeconomic History    Marital status: Single     Spouse name: Not on file    Number of children: Not on file    Years of education: Not on file    Highest education level: Not on file   Occupational History    Not on file   Tobacco Use    Smoking status: Former Smoker     Types: Cigars    Smokeless tobacco: Never Used   Vaping Use    Vaping Use: Never used   Substance and Sexual Activity    Alcohol use: No    Drug use: Not Currently     Types: Marijuana (Weed)     Comment: not on a regular basis, quit 2 months ago    Sexual activity: Not on file   Other Topics Concern    Not on file   Social History Narrative    Not on file     Social Determinants of Health     Financial Resource Strain: Low Risk     Difficulty of Paying Living Expenses: Not hard at all   Food Insecurity: No Food Insecurity    Worried About [address] in the Last Year: Never true    [place] [employer] in the Last Year: Never true   Transportation Needs: No Transportation Needs    Lack of Transportation (Medical): No    Lack of Transportation (Non-Medical): No   Physical Activity:     Days of Exercise per Week: Not on file    Minutes of Exercise per Session: Not on file   Stress:     Feeling of Stress : Not on file   Social Connections:     Frequency of Communication with Friends and Family: Not on file    Frequency of Social Gatherings with Friends and Family: Not on file    Attends Jain Services: Not on file    Active Member of [address] [place] [address] or Organizations: Not on file    Attends Club or Organization Meetings: Not on file    Marital Status: Not on file   Intimate Partner Violence:     Fear of Current or Ex-Partner: Not on file    Emotionally Abused: Not on file    Physically Abused: Not on file    Sexually Abused: Not on file   Housing Stability:     Unable to Pay for Housing in the Last Year: Not on file    Number of [place] in the Last Year: Not on file    Unstable Housing in the Last Year: Not on file         Review of Systems   Constitutional: Negative for chills, diaphoresis, fatigue and fever. HENT: Negative for congestion, mouth sores, nosebleeds, postnasal drip, rhinorrhea, sneezing, sore throat and voice change. Eyes: Negative for itching and visual disturbance. Respiratory: Negative for cough, chest tightness, shortness of breath and wheezing. Cardiovascular: Negative. Negative for chest pain, palpitations and leg swelling. Gastrointestinal: Negative for abdominal pain, diarrhea, nausea and vomiting. Genitourinary: Negative for difficulty urinating and hematuria. Musculoskeletal: Negative for arthralgias, joint swelling and myalgias. Skin: Negative for rash. Allergic/Immunologic: Negative for environmental allergies. Neurological: Negative for dizziness, tremors, weakness and headaches.    Psychiatric/Behavioral: Negative for behavioral problems and sleep disturbance.         :     Vitals:    02/16/22 1329   BP: 125/76   Pulse: 79   Temp: 98.8 °F (37.1 °C)   TempSrc: Tympanic   SpO2: 99%   Weight: 239 lb (108.4 kg)   Height: 5' 6\" (1.676 m)     Wt Readings from Last 3 Encounters:   02/16/22 239 lb (108.4 kg)   11/19/21 220 lb (99.8 kg)   11/13/21 220 lb (99.8 kg)         Physical Exam  Constitutional:       Appearance: He is well-developed. HENT:      Head: Normocephalic and atraumatic. Nose: Nose normal.   Eyes:      Conjunctiva/sclera: Conjunctivae normal.      Pupils: Pupils are equal, round, and reactive to light. Neck:      Thyroid: No thyromegaly. Vascular: No JVD. Trachea: No tracheal deviation. Cardiovascular:      Rate and Rhythm: Normal rate and regular rhythm. Heart sounds: No murmur heard. No friction rub. No gallop. Pulmonary:      Effort: Pulmonary effort is normal. No respiratory distress. Breath sounds: Normal breath sounds. No wheezing or rales. Chest:      Chest wall: No tenderness. Abdominal:      General: There is no distension. Musculoskeletal:         General: Normal range of motion. Lymphadenopathy:      Cervical: No cervical adenopathy. Skin:     General: Skin is warm and dry. Findings: No rash. Neurological:      Mental Status: He is alert and oriented to person, place, and time. Cranial Nerves: No cranial nerve deficit. Psychiatric:         Behavior: Behavior normal.         Current Outpatient Medications   Medication Sig Dispense Refill    apixaban (ELIQUIS) 5 MG TABS tablet Take 1 tablet by mouth 2 times daily 60 tablet 0     No current facility-administered medications for this visit. No results found for this or any previous visit.  ]  Results for orders placed during the hospital encounter of 11/14/21    XR CHEST PORTABLE    Narrative  Exam: XR CHEST PORTABLE    History: Shortness breath. Cough. Technique: AP portable view of the chest obtained. Comparison: Chest x-rays November 27, 2007    Findings:     The cardiomediastinal silhouette is within normal limits. Small left hilar opacity. No pneumothorax or pleural effusion. Bones of the thorax appear intact. Impression  Small left hilar opacity likely represents pneumonia however continued follow-up is recommended to ensure resolution. Assessment/Plan:     1. Pneumonia due to COVID-19 virus  He was positive for covid and had PE in November 2021. He had Ct chest Left lower lobar pulmonary embolism with extension distally. Bilateral parenchymal opacities is suggestive of multifocal pneumonia, including viral etiologies. No DVT in lower extremity. No shortness of breathNo cough, No sputumNo wheezingHe is willing  To do CXR for f/u pneumonia    - CA DISCHARGE MEDS RECONCILED W/ CURRENT OUTPATIENT MED LIST  - XR CHEST STANDARD (2 VW); Future    2. History of pulmonary embolism  He is on Eliquis since he has PE 11/21. CT chest left lower lobe PE with extension distally      Return in about 2 months (around 4/16/2022) for CXR result.       Darlynn Paget, MD

## 2022-04-27 ENCOUNTER — OFFICE VISIT (OUTPATIENT)
Dept: PULMONOLOGY | Age: 22
End: 2022-04-27
Payer: MEDICAID

## 2022-04-27 VITALS
HEART RATE: 94 BPM | HEIGHT: 66 IN | BODY MASS INDEX: 33.91 KG/M2 | WEIGHT: 211 LBS | SYSTOLIC BLOOD PRESSURE: 132 MMHG | OXYGEN SATURATION: 99 % | DIASTOLIC BLOOD PRESSURE: 80 MMHG | TEMPERATURE: 98.2 F

## 2022-04-27 DIAGNOSIS — Z86.711 HISTORY OF PULMONARY EMBOLISM: ICD-10-CM

## 2022-04-27 DIAGNOSIS — J12.82 PNEUMONIA DUE TO COVID-19 VIRUS: Primary | ICD-10-CM

## 2022-04-27 DIAGNOSIS — U07.1 PNEUMONIA DUE TO COVID-19 VIRUS: Primary | ICD-10-CM

## 2022-04-27 PROCEDURE — 99214 OFFICE O/P EST MOD 30 MIN: CPT | Performed by: INTERNAL MEDICINE

## 2022-04-27 ASSESSMENT — ENCOUNTER SYMPTOMS
RHINORRHEA: 0
VOMITING: 0
SORE THROAT: 0
SHORTNESS OF BREATH: 0
EYE ITCHING: 0
COUGH: 0
NAUSEA: 0
WHEEZING: 0
CHEST TIGHTNESS: 0
ABDOMINAL PAIN: 0
DIARRHEA: 0
VOICE CHANGE: 0

## 2022-04-27 NOTE — PROGRESS NOTES
Subjective:     Kiran Fernandez is a 25 y.o. male who complains today of:     Chief Complaint   Patient presents with    Pneumonia     2 month f/u       HPI  He was positive for covid and had PE in November 2021. He had Ct chest Left lower lobar pulmonary embolism with extension distally. Bilateral parenchymal opacities is suggestive of multifocal pneumonia, including viral etiologies. He was on Eliquis since he has PE 11/21 he stop after 3 month. CXR no active disease  No shortness of breath , no wheezing   No cough  or chest pain     Allergies:  Patient has no known allergies. No past medical history on file. No past surgical history on file. Family History   Problem Relation Age of Onset    No Known Problems Mother     No Known Problems Father      Social History     Socioeconomic History    Marital status: Single     Spouse name: Not on file    Number of children: Not on file    Years of education: Not on file    Highest education level: Not on file   Occupational History    Not on file   Tobacco Use    Smoking status: Former Smoker     Types: Cigars    Smokeless tobacco: Never Used   Vaping Use    Vaping Use: Never used   Substance and Sexual Activity    Alcohol use: No    Drug use: Not Currently     Types: Marijuana (Weed)     Comment: not on a regular basis, quit 2 months ago    Sexual activity: Not on file   Other Topics Concern    Not on file   Social History Narrative    Not on file     Social Determinants of Health     Financial Resource Strain: Low Risk     Difficulty of Paying Living Expenses: Not hard at all   Food Insecurity: No Food Insecurity    Worried About [address] in the Last Year: Never true    [address]  in the Last Year: Never true   Transportation Needs: No Transportation Needs    Lack of Transportation (Medical): No    Lack of Transportation (Non-Medical):  No   Physical Activity:     Days of Exercise per Week: Not on file    Minutes of Exercise per Session: Not on file   Stress:     Feeling of Stress : Not on file   Social Connections:     Frequency of Communication with Friends and Family: Not on file    Frequency of Social Gatherings with Friends and Family: Not on file    Attends Orthodoxy Services: Not on file    Active Member of Clubs or Organizations: Not on file    Attends Club or Organization Meetings: Not on file    Marital Status: Not on file   Intimate Partner Violence:     Fear of Current or Ex-Partner: Not on file    Emotionally Abused: Not on file    Physically Abused: Not on file    Sexually Abused: Not on file   Housing Stability:     Unable to Pay for Housing in the Last Year: Not on file    Number of [place] in the Last Year: Not on file    Unstable Housing in the Last Year: Not on file         Review of Systems   Constitutional: Negative for chills, diaphoresis, fatigue and fever. HENT: Negative for congestion, mouth sores, nosebleeds, postnasal drip, rhinorrhea, sneezing, sore throat and voice change. Eyes: Negative for itching and visual disturbance. Respiratory: Negative for cough, chest tightness, shortness of breath and wheezing. Cardiovascular: Negative. Negative for chest pain, palpitations and leg swelling. Gastrointestinal: Negative for abdominal pain, diarrhea, nausea and vomiting. Genitourinary: Negative for difficulty urinating and hematuria. Musculoskeletal: Negative for arthralgias, joint swelling and myalgias. Skin: Negative for rash. Allergic/Immunologic: Negative for environmental allergies. Neurological: Negative for dizziness, tremors, weakness and headaches.    Psychiatric/Behavioral: Negative for behavioral problems and sleep disturbance.         :     Vitals:    04/27/22 1121   BP: 132/80   Pulse: 94   Temp: 98.2 °F (36.8 °C)   SpO2: 99%   Weight: 211 lb (95.7 kg)   Height: 5' 6\" (1.676 m)     Wt Readings from Last 3 Encounters:   04/27/22 211 lb (95.7 kg)   02/16/22 239 lb (108.4 kg)   11/19/21 220 lb (99.8 kg)         Physical Exam  Constitutional:       Appearance: He is well-developed. He is obese. HENT:      Head: Normocephalic and atraumatic. Nose: Nose normal.   Eyes:      Conjunctiva/sclera: Conjunctivae normal.      Pupils: Pupils are equal, round, and reactive to light. Neck:      Thyroid: No thyromegaly. Vascular: No JVD. Trachea: No tracheal deviation. Cardiovascular:      Rate and Rhythm: Normal rate and regular rhythm. Heart sounds: No murmur heard. No friction rub. No gallop. Pulmonary:      Effort: Pulmonary effort is normal. No respiratory distress. Breath sounds: Normal breath sounds. No wheezing or rales. Chest:      Chest wall: No tenderness. Abdominal:      General: There is no distension. Musculoskeletal:         General: Normal range of motion. Lymphadenopathy:      Cervical: No cervical adenopathy. Skin:     General: Skin is warm and dry. Findings: No rash. Neurological:      Mental Status: He is alert and oriented to person, place, and time. Cranial Nerves: No cranial nerve deficit. Psychiatric:         Behavior: Behavior normal.         No current outpatient medications on file. No current facility-administered medications for this visit. Results for orders placed during the hospital encounter of 02/16/22    XR CHEST (2 VW)    Narrative  EXAMINATION: XR CHEST (2 VW)    CLINICAL HISTORY: U07.1 Pneumonia due to COVID-19 virus ICD10    COMPARISONS: November 14, 2021 0107 hours    FINDINGS:    Two views of the chest are submitted. The cardiac silhouette is of normal size configuration. Pulmonary vascular unremarkable. Right sided trachea. No focal infiltrates. No effusions. No Pneumothoraces.     Impression  NO ACUTE ACTIVE CARDIOPULMONARY PROCESS  ]  Results for orders placed during the hospital encounter of 11/14/21    XR CHEST PORTABLE    Narrative  Exam: XR CHEST PORTABLE    History: Shortness breath. Cough. Technique: AP portable view of the chest obtained. Comparison: Chest x-rays November 27, 2007    Findings: The cardiomediastinal silhouette is within normal limits. Small left hilar opacity. No pneumothorax or pleural effusion. Bones of the thorax appear intact. Impression  Small left hilar opacity likely represents pneumonia however continued follow-up is recommended to ensure resolution.  ]    Assessment/Plan:     1. Pneumonia due to COVID-19 virus  He was positive for covid and had PE in November 2021. He had Ct chest Left lower lobar pulmonary embolism with extension distally. Bilateral parenchymal opacities is suggestive of multifocal pneumonia, including viral etiologies. No shortness of breath , no wheezing No cough  or chest pain . CXR no active disease    2. History of pulmonary embolism  He was on Eliquis since he has PE 11/21 he stop after 3 month. No follow-ups on file.       Jose Augustine MD

## 2024-01-19 ENCOUNTER — HOSPITAL ENCOUNTER (EMERGENCY)
Age: 24
Discharge: HOME | End: 2024-01-19
Payer: MEDICAID

## 2024-01-19 VITALS
SYSTOLIC BLOOD PRESSURE: 139 MMHG | WEIGHT: 240 LBS | OXYGEN SATURATION: 98 % | HEART RATE: 79 BPM | DIASTOLIC BLOOD PRESSURE: 83 MMHG | TEMPERATURE: 97.9 F | RESPIRATION RATE: 16 BRPM | BODY MASS INDEX: 38.74 KG/M2

## 2024-01-19 DIAGNOSIS — R30.0 DYSURIA: Primary | ICD-10-CM

## 2024-01-19 LAB
BILIRUB UR QL STRIP: NEGATIVE
CLARITY UR: CLEAR
COLOR UR: YELLOW
GLUCOSE UR STRIP-MCNC: NEGATIVE MG/DL
HGB UR QL STRIP: NEGATIVE
KETONES UR STRIP-MCNC: NEGATIVE MG/DL
LEUKOCYTE ESTERASE UR QL STRIP: NEGATIVE
NITRITE UR QL STRIP: NEGATIVE
PH UR STRIP: 6 [PH] (ref 5–9)
PROT UR STRIP-MCNC: NEGATIVE MG/DL
SP GR UR STRIP: 1.02 (ref 1–1.03)
URINE REFLEX TO CULTURE: NORMAL
UROBILINOGEN UR STRIP-ACNC: 0.2 E.U./DL

## 2024-01-19 PROCEDURE — 6360000002 HC RX W HCPCS: Performed by: PHYSICIAN ASSISTANT

## 2024-01-19 PROCEDURE — 6370000000 HC RX 637 (ALT 250 FOR IP): Performed by: PHYSICIAN ASSISTANT

## 2024-01-19 PROCEDURE — 81003 URINALYSIS AUTO W/O SCOPE: CPT

## 2024-01-19 PROCEDURE — 96372 THER/PROPH/DIAG INJ SC/IM: CPT

## 2024-01-19 PROCEDURE — 99284 EMERGENCY DEPT VISIT MOD MDM: CPT

## 2024-01-19 RX ORDER — METRONIDAZOLE 500 MG/1
2000 TABLET ORAL ONCE
Status: COMPLETED | OUTPATIENT
Start: 2024-01-19 | End: 2024-01-19

## 2024-01-19 RX ORDER — AZITHROMYCIN 500 MG/1
1000 TABLET, FILM COATED ORAL ONCE
Status: COMPLETED | OUTPATIENT
Start: 2024-01-19 | End: 2024-01-19

## 2024-01-19 RX ORDER — CEFTRIAXONE 1 G/1
500 INJECTION, POWDER, FOR SOLUTION INTRAMUSCULAR; INTRAVENOUS ONCE
Status: COMPLETED | OUTPATIENT
Start: 2024-01-19 | End: 2024-01-19

## 2024-01-19 RX ADMIN — CEFTRIAXONE SODIUM 500 MG: 1 INJECTION, POWDER, FOR SOLUTION INTRAMUSCULAR; INTRAVENOUS at 14:44

## 2024-01-19 RX ADMIN — METRONIDAZOLE 2000 MG: 500 TABLET ORAL at 14:36

## 2024-01-19 RX ADMIN — AZITHROMYCIN 1000 MG: 500 TABLET, FILM COATED ORAL at 14:36

## 2024-01-19 ASSESSMENT — ENCOUNTER SYMPTOMS
VOMITING: 0
NAUSEA: 0
RHINORRHEA: 0
COUGH: 0
DIARRHEA: 0
ABDOMINAL PAIN: 0
BACK PAIN: 0
SORE THROAT: 0
PHOTOPHOBIA: 0
EYE PAIN: 0
SHORTNESS OF BREATH: 0

## 2024-01-19 NOTE — ED PROVIDER NOTES
[facility] ED  EMERGENCY DEPARTMENT ENCOUNTER      Pt Name: Speedy Birmingham  MRN: [id number]  Birthdate 2000  Date of evaluation: 1/19/2024  Provider: Flores Carballo PA-C      HISTORY OF PRESENT ILLNESS    Speedy Birmingham is a 24 y.o. male who presents to the Emergency Department with dysuria and frequency for 2-3 days. Denies discharge. Does admit to new partner. Denies lesion or abdominal pain.         REVIEW OF SYSTEMS       Review of Systems   Constitutional:  Negative for chills, diaphoresis, fatigue and fever.   HENT:  Negative for congestion, rhinorrhea and sore throat.    Eyes:  Negative for photophobia and pain.   Respiratory:  Negative for cough and shortness of breath.    Cardiovascular:  Negative for chest pain and palpitations.   Gastrointestinal:  Negative for abdominal pain, diarrhea, nausea and vomiting.   Genitourinary:  Positive for dysuria and frequency. Negative for flank pain.   Musculoskeletal:  Negative for back pain.   Skin:  Negative for rash.   Neurological:  Negative for dizziness, light-headedness and headaches.   Psychiatric/Behavioral: Negative.     All other systems reviewed and are negative.        PAST MEDICAL HISTORY   No past medical history on file.      SURGICAL HISTORY     No past surgical history on file.      CURRENT MEDICATIONS       Previous Medications    No medications on file       ALLERGIES     Patient has no known allergies.    FAMILY HISTORY       Family History   Problem Relation Age of Onset    No Known Problems Mother     No Known Problems Father           SOCIAL HISTORY       Social History     Socioeconomic History    Marital status: Single   Tobacco Use    Smoking status: Former     Types: Cigars    Smokeless tobacco: Never   Vaping Use    Vaping Use: Never used   Substance and Sexual Activity    Alcohol use: No    Drug use: Not Currently     Types: Marijuana (Weed)     Comment: not on a regular basis, quit 2 months ago     Social Determinants of

## 2024-01-23 LAB
C TRACH DNA UR QL NAA+PROBE: NEGATIVE
N GONORRHOEA DNA UR QL NAA+PROBE: NEGATIVE

## 2024-02-13 ENCOUNTER — HOSPITAL ENCOUNTER (EMERGENCY)
Age: 24
Discharge: HOME | End: 2024-02-13
Payer: MEDICAID

## 2024-02-13 VITALS
TEMPERATURE: 98.3 F | WEIGHT: 240 LBS | HEART RATE: 77 BPM | SYSTOLIC BLOOD PRESSURE: 112 MMHG | BODY MASS INDEX: 38.57 KG/M2 | DIASTOLIC BLOOD PRESSURE: 67 MMHG | OXYGEN SATURATION: 99 % | RESPIRATION RATE: 17 BRPM | HEIGHT: 66 IN

## 2024-02-13 DIAGNOSIS — Z71.1 NO PROBLEM, FEARED COMPLAINT UNFOUNDED: Primary | ICD-10-CM

## 2024-02-13 LAB
ALBUMIN SERPL-MCNC: 4.2 G/DL (ref 3.5–4.6)
ALP SERPL-CCNC: 78 U/L (ref 35–104)
ALT SERPL-CCNC: 61 U/L (ref 0–41)
ANION GAP SERPL CALCULATED.3IONS-SCNC: 10 MEQ/L (ref 9–15)
AST SERPL-CCNC: 30 U/L (ref 0–40)
BASOPHILS # BLD: 0 K/UL (ref 0–0.2)
BASOPHILS NFR BLD: 0.4 %
BILIRUB SERPL-MCNC: 0.3 MG/DL (ref 0.2–0.7)
BUN SERPL-MCNC: 9 MG/DL (ref 6–20)
CALCIUM SERPL-MCNC: 9.4 MG/DL (ref 8.5–9.9)
CHLORIDE SERPL-SCNC: 103 MEQ/L (ref 95–107)
CO2 SERPL-SCNC: 24 MEQ/L (ref 20–31)
CREAT SERPL-MCNC: 1.06 MG/DL (ref 0.7–1.2)
EOSINOPHIL # BLD: 0.4 K/UL (ref 0–0.7)
EOSINOPHIL NFR BLD: 4.3 %
ERYTHROCYTE [DISTWIDTH] IN BLOOD BY AUTOMATED COUNT: 15 % (ref 11.5–14.5)
GLOBULIN SER CALC-MCNC: 3 G/DL (ref 2.3–3.5)
GLUCOSE SERPL-MCNC: 105 MG/DL (ref 70–99)
HCT VFR BLD AUTO: 45.6 % (ref 42–52)
HGB BLD-MCNC: 14.1 G/DL (ref 14–18)
LACTATE BLDV-SCNC: 1.5 MMOL/L (ref 0.5–2.2)
LIPASE SERPL-CCNC: 28 U/L (ref 12–95)
LYMPHOCYTES # BLD: 2.1 K/UL (ref 1–4.8)
LYMPHOCYTES NFR BLD: 24 %
MCH RBC QN AUTO: 24.2 PG (ref 27–31.3)
MCHC RBC AUTO-ENTMCNC: 30.9 % (ref 33–37)
MCV RBC AUTO: 78.4 FL (ref 79–92.2)
MONOCYTES # BLD: 0.5 K/UL (ref 0.2–0.8)
MONOCYTES NFR BLD: 6.1 %
NEUTROPHILS # BLD: 5.8 K/UL (ref 1.4–6.5)
NEUTS SEG NFR BLD: 64.8 %
PLATELET # BLD AUTO: 290 K/UL (ref 130–400)
POTASSIUM SERPL-SCNC: 4.3 MEQ/L (ref 3.4–4.9)
PROT SERPL-MCNC: 7.2 G/DL (ref 6.3–8)
RBC # BLD AUTO: 5.82 M/UL (ref 4.7–6.1)
SODIUM SERPL-SCNC: 137 MEQ/L (ref 135–144)
WBC # BLD AUTO: 8.9 K/UL (ref 4.8–10.8)

## 2024-02-13 PROCEDURE — 85025 COMPLETE CBC W/AUTO DIFF WBC: CPT

## 2024-02-13 PROCEDURE — 99283 EMERGENCY DEPT VISIT LOW MDM: CPT

## 2024-02-13 PROCEDURE — 80053 COMPREHEN METABOLIC PANEL: CPT

## 2024-02-13 PROCEDURE — 36415 COLL VENOUS BLD VENIPUNCTURE: CPT

## 2024-02-13 PROCEDURE — 83690 ASSAY OF LIPASE: CPT

## 2024-02-13 PROCEDURE — 83605 ASSAY OF LACTIC ACID: CPT

## 2024-02-13 NOTE — ED NOTES
Pt stable, a&ox4, skin w/d/pink, 0 distress, 0 sob, 0 n&v, 0 bleeding reported.  Pt out from ed with steady gait, 0 problems.

## 2024-02-13 NOTE — DISCHARGE INSTRUCTIONS
No signs of bleeding, or blood was noted within your stool at this time, if there is a recurrence, follow-up with your family provider, or return to the ED for reevaluation.

## 2024-02-13 NOTE — ED TRIAGE NOTES
A & Ox4. Skin warm and dry. States had a small hard BM this morning and then had bright red blood when he wiped. Points to left middle and lower abdomen for pains. Denies N/V

## 2024-02-13 NOTE — ED PROVIDER NOTES
Weight: 108.9 kg (240 lb)    Height: 1.676 m (5' 6\")            Medical Decision Making  Patient presented ED with complaint of noticing blood within the stool with a bowel movement this morning, patient denies any acute injury, no straining for bowel movements, on examination there is small amount of red content around the rectum and bowel, this was tested, and is guaiac negative, patient states he has been eating fire hot Cheetos, it is believed that the red content is dye from Cheetos, rectal exam is negative, there is no external hemorrhoids, no internal masses.  Blood counts are stable, patient was discharged home as of 3 complaint unfounded, he was advised if there is any recurrence, or noticing any bleeding, he should follow-up with his family provider, or return to the ED for reevaluation.    Amount and/or Complexity of Data Reviewed  Labs: ordered.            REASSESSMENT          CRITICAL CARE TIME   Total Critical Care time was  minutes, excluding separately reportable procedures.  There was a high probability of clinically significant/life threatening deterioration in the patient's condition which required my urgent intervention.      CONSULTS:  None    PROCEDURES:  Unless otherwise noted below, none     Procedures        FINAL IMPRESSION      1. No problem, feared complaint unfounded          DISPOSITION/PLAN   DISPOSITION Decision To Discharge 02/13/2024 11:47:43 AM      PATIENT REFERRED TO:  Dariela Swan DO  [address] [id number] [phone]    In 3 days        DISCHARGE MEDICATIONS:  Discharge Medication List as of 2/13/2024 11:48 AM        Controlled Substances Monitoring:          No data to display                (Please note that portions of this note were completed with a voice recognition program.  Efforts were made to edit the dictations but occasionally words are mis-transcribed.)    Luis Desouza PA-C (electronically signed)  Attending Emergency

## 2024-10-04 ENCOUNTER — HOSPITAL ENCOUNTER (EMERGENCY)
Age: 24
Discharge: HOME | End: 2024-10-04
Payer: MEDICAID

## 2024-10-04 VITALS
DIASTOLIC BLOOD PRESSURE: 74 MMHG | RESPIRATION RATE: 16 BRPM | HEART RATE: 63 BPM | BODY MASS INDEX: 38.57 KG/M2 | HEIGHT: 66 IN | SYSTOLIC BLOOD PRESSURE: 130 MMHG | TEMPERATURE: 98 F | WEIGHT: 240 LBS | OXYGEN SATURATION: 98 %

## 2024-10-04 DIAGNOSIS — Z11.3 SCREENING EXAMINATION FOR STD (SEXUALLY TRANSMITTED DISEASE): ICD-10-CM

## 2024-10-04 DIAGNOSIS — R30.0 DYSURIA: Primary | ICD-10-CM

## 2024-10-04 LAB
BILIRUB UR QL STRIP: NEGATIVE
CLARITY UR: CLEAR
COLOR UR: YELLOW
GLUCOSE UR STRIP-MCNC: NEGATIVE MG/DL
HGB UR QL STRIP: NEGATIVE
KETONES UR STRIP-MCNC: ABNORMAL MG/DL
LEUKOCYTE ESTERASE UR QL STRIP: NEGATIVE
NITRITE UR QL STRIP: NEGATIVE
PH UR STRIP: 6 [PH] (ref 5–9)
PROT UR STRIP-MCNC: NEGATIVE MG/DL
SP GR UR STRIP: 1.03 (ref 1–1.03)
SPECIMEN SOURCE: NORMAL
URINE REFLEX TO CULTURE: ABNORMAL
UROBILINOGEN UR STRIP-ACNC: 0.2 E.U./DL

## 2024-10-04 PROCEDURE — 81003 URINALYSIS AUTO W/O SCOPE: CPT

## 2024-10-04 PROCEDURE — 99284 EMERGENCY DEPT VISIT MOD MDM: CPT

## 2024-10-04 PROCEDURE — 6360000002 HC RX W HCPCS

## 2024-10-04 PROCEDURE — 6370000000 HC RX 637 (ALT 250 FOR IP)

## 2024-10-04 PROCEDURE — 87661 TRICHOMONAS VAGINALIS AMPLIF: CPT

## 2024-10-04 PROCEDURE — 96372 THER/PROPH/DIAG INJ SC/IM: CPT

## 2024-10-04 RX ORDER — METRONIDAZOLE 500 MG/1
500 TABLET ORAL 2 TIMES DAILY
Qty: 14 TABLET | Refills: 0 | Status: SHIPPED | OUTPATIENT
Start: 2024-10-04 | End: 2024-10-11

## 2024-10-04 RX ORDER — DOXYCYCLINE HYCLATE 100 MG
100 TABLET ORAL 2 TIMES DAILY
Qty: 14 TABLET | Refills: 0 | Status: SHIPPED | OUTPATIENT
Start: 2024-10-04 | End: 2024-10-11

## 2024-10-04 RX ORDER — METRONIDAZOLE 500 MG/1
500 TABLET ORAL ONCE
Status: COMPLETED | OUTPATIENT
Start: 2024-10-04 | End: 2024-10-04

## 2024-10-04 RX ORDER — CEFTRIAXONE 1 G/1
500 INJECTION, POWDER, FOR SOLUTION INTRAMUSCULAR; INTRAVENOUS ONCE
Status: COMPLETED | OUTPATIENT
Start: 2024-10-04 | End: 2024-10-04

## 2024-10-04 RX ORDER — DOXYCYCLINE 100 MG/1
100 CAPSULE ORAL ONCE
Status: COMPLETED | OUTPATIENT
Start: 2024-10-04 | End: 2024-10-04

## 2024-10-04 RX ADMIN — CEFTRIAXONE SODIUM 500 MG: 1 INJECTION, POWDER, FOR SOLUTION INTRAMUSCULAR; INTRAVENOUS at 13:03

## 2024-10-04 RX ADMIN — DOXYCYCLINE HYCLATE 100 MG: 100 CAPSULE ORAL at 13:01

## 2024-10-04 RX ADMIN — METRONIDAZOLE 500 MG: 500 TABLET ORAL at 13:02

## 2024-10-04 ASSESSMENT — PAIN DESCRIPTION - DESCRIPTORS: DESCRIPTORS: BURNING

## 2024-10-04 ASSESSMENT — LIFESTYLE VARIABLES
HOW MANY STANDARD DRINKS CONTAINING ALCOHOL DO YOU HAVE ON A TYPICAL DAY: 1 OR 2
HOW OFTEN DO YOU HAVE A DRINK CONTAINING ALCOHOL: MONTHLY OR LESS

## 2024-10-04 ASSESSMENT — PAIN DESCRIPTION - FREQUENCY: FREQUENCY: CONTINUOUS

## 2024-10-04 ASSESSMENT — PAIN DESCRIPTION - PAIN TYPE: TYPE: ACUTE PAIN

## 2024-10-04 ASSESSMENT — PAIN SCALES - GENERAL: PAINLEVEL_OUTOF10: 3

## 2024-10-04 ASSESSMENT — PAIN - FUNCTIONAL ASSESSMENT: PAIN_FUNCTIONAL_ASSESSMENT: 0-10

## 2024-10-04 NOTE — ED PROVIDER NOTES
Medication List as of 10/4/2024 12:51 PM        START taking these medications    Details   metroNIDAZOLE (FLAGYL) 500 MG tablet Take 1 tablet by mouth 2 times daily for 7 days, Disp-14 tablet, R-0Normal      doxycycline hyclate (VIBRA-TABS) 100 MG tablet Take 1 tablet by mouth 2 times daily for 7 days, Disp-14 tablet, R-0Normal           Controlled Substances Monitoring:          No data to display                (Please note that portions of this note were completed with a voice recognition program.  Efforts were made to edit the dictations but occasionally words are mis-transcribed.)    Ailyn Terrell NP-LIZABETH (electronically signed)    Supervising Attending Physician: Dr. Salazar.     Ailyn Terrell, HERO-C  10/04/24 [id number]

## 2024-10-04 NOTE — DISCHARGE INSTRUCTIONS
Take medications as directed.    Results of STD screening will populate into [facility] MyChart within the next 5 to 7 days.  Please refrain from unprotected sexual intercourse and utilize condoms for future sexual encounters.    Do not consume alcohol while on Flagyl and for 72 hours following medication completion.    Return to ED if any new, or worsening symptoms.

## 2024-10-07 LAB
SPECIMEN SOURCE: NORMAL
T VAGINALIS RRNA SPEC QL NAA+PROBE: NEGATIVE

## 2024-10-09 LAB
C TRACH DNA UR QL NAA+PROBE: NEGATIVE
N GONORRHOEA DNA UR QL NAA+PROBE: NEGATIVE

## 2025-01-27 ENCOUNTER — APPOINTMENT (OUTPATIENT)
Dept: CT IMAGING | Age: 25
End: 2025-01-27
Payer: OTHER MISCELLANEOUS

## 2025-01-27 ENCOUNTER — HOSPITAL ENCOUNTER (EMERGENCY)
Age: 25
Discharge: HOME | End: 2025-01-27
Payer: OTHER MISCELLANEOUS

## 2025-01-27 VITALS
HEIGHT: 67 IN | HEART RATE: 78 BPM | BODY MASS INDEX: 36.1 KG/M2 | WEIGHT: 230 LBS | RESPIRATION RATE: 18 BRPM | SYSTOLIC BLOOD PRESSURE: 117 MMHG | TEMPERATURE: 98 F | OXYGEN SATURATION: 100 % | DIASTOLIC BLOOD PRESSURE: 77 MMHG

## 2025-01-27 DIAGNOSIS — S16.1XXA STRAIN OF NECK MUSCLE, INITIAL ENCOUNTER: ICD-10-CM
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
## 2025-01-27 PROCEDURE — 70450 CT HEAD/BRAIN W/O DYE: CPT

## 2025-01-27 PROCEDURE — 72125 CT NECK SPINE W/O DYE: CPT

## 2025-01-27 PROCEDURE — 99284 EMERGENCY DEPT VISIT MOD MDM: CPT

## 2025-01-27 PROCEDURE — 6370000000 HC RX 637 (ALT 250 FOR IP): Performed by: PHYSICIAN ASSISTANT

## 2025-01-27 RX ORDER — NAPROXEN 500 MG/1
500 TABLET ORAL 2 TIMES DAILY WITH MEALS
Qty: 60 TABLET | Refills: 0 | Status: SHIPPED | OUTPATIENT
Start: 2025-01-27

## 2025-01-27 RX ORDER — METHOCARBAMOL 500 MG/1
500 TABLET, FILM COATED ORAL 4 TIMES DAILY PRN
Qty: 20 TABLET | Refills: 0 | Status: SHIPPED | OUTPATIENT
Start: 2025-01-27 | End: 2025-02-01

## 2025-01-27 RX ORDER — OXYCODONE AND ACETAMINOPHEN 5; 325 MG/1; MG/1
1 TABLET ORAL ONCE
Status: COMPLETED | OUTPATIENT
Start: 2025-01-27 | End: 2025-01-27

## 2025-01-27 RX ORDER — METHOCARBAMOL 500 MG/1
500 TABLET, FILM COATED ORAL ONCE
Status: COMPLETED | OUTPATIENT
Start: 2025-01-27 | End: 2025-01-27

## 2025-01-27 RX ORDER — ONDANSETRON 4 MG/1
4 TABLET, FILM COATED ORAL 3 TIMES DAILY PRN
Qty: 15 TABLET | Refills: 0 | Status: SHIPPED | OUTPATIENT
Start: 2025-01-27

## 2025-01-27 RX ADMIN — OXYCODONE HYDROCHLORIDE AND ACETAMINOPHEN 1 TABLET: 5; 325 TABLET ORAL at 18:58

## 2025-01-27 RX ADMIN — METHOCARBAMOL TABLETS 500 MG: 500 TABLET, COATED ORAL at 18:58

## 2025-01-27 ASSESSMENT — PAIN DESCRIPTION - ORIENTATION: ORIENTATION: LOWER

## 2025-01-27 ASSESSMENT — PAIN SCALES - GENERAL
PAINLEVEL_OUTOF10: 9
PAINLEVEL_OUTOF10: 9

## 2025-01-27 ASSESSMENT — PAIN DESCRIPTION - DESCRIPTORS: DESCRIPTORS: ACHING

## 2025-01-27 ASSESSMENT — PAIN - FUNCTIONAL ASSESSMENT: PAIN_FUNCTIONAL_ASSESSMENT: 0-10

## 2025-01-27 ASSESSMENT — LIFESTYLE VARIABLES
HOW OFTEN DO YOU HAVE A DRINK CONTAINING ALCOHOL: NEVER
HOW MANY STANDARD DRINKS CONTAINING ALCOHOL DO YOU HAVE ON A TYPICAL DAY: PATIENT DOES NOT DRINK

## 2025-01-27 ASSESSMENT — PAIN DESCRIPTION - LOCATION: LOCATION: NECK;BACK
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
## 2025-01-28 NOTE — DISCHARGE INSTRUCTIONS
You can take naproxen up to twice a day with food for pain.  You can trial the muscle laxer Robaxin.  Zofran for nausea and vomiting.  Return immediately for new or worsening symptoms.  
 used

## 2025-06-26 ENCOUNTER — HOSPITAL ENCOUNTER (EMERGENCY)
Age: 25
Discharge: HOME | End: 2025-06-26
Payer: MEDICAID

## 2025-06-26 VITALS
DIASTOLIC BLOOD PRESSURE: 81 MMHG | SYSTOLIC BLOOD PRESSURE: 104 MMHG | HEART RATE: 61 BPM | HEIGHT: 67 IN | RESPIRATION RATE: 16 BRPM | TEMPERATURE: 98.2 F | WEIGHT: 254.6 LBS | OXYGEN SATURATION: 100 % | BODY MASS INDEX: 39.96 KG/M2

## 2025-06-26 DIAGNOSIS — K64.4 EXTERNAL HEMORRHOID: Primary | ICD-10-CM

## 2025-06-26 LAB
ALBUMIN SERPL-MCNC: 4.3 G/DL (ref 3.5–4.6)
ALP SERPL-CCNC: 74 U/L (ref 35–104)
ALT SERPL-CCNC: 18 U/L (ref 0–41)
ANION GAP SERPL CALCULATED.3IONS-SCNC: 9 MEQ/L (ref 9–15)
AST SERPL-CCNC: 17 U/L (ref 0–40)
BASOPHILS # BLD: 0.1 K/UL (ref 0–0.2)
BASOPHILS NFR BLD: 0.5 %
BILIRUB SERPL-MCNC: 0.3 MG/DL (ref 0.2–0.7)
BUN SERPL-MCNC: 8 MG/DL (ref 6–20)
CALCIUM SERPL-MCNC: 9.3 MG/DL (ref 8.5–9.9)
CHLORIDE SERPL-SCNC: 103 MEQ/L (ref 95–107)
CO2 SERPL-SCNC: 26 MEQ/L (ref 20–31)
CREAT SERPL-MCNC: 1.13 MG/DL (ref 0.7–1.2)
EOSINOPHIL # BLD: 1.2 K/UL (ref 0–0.7)
EOSINOPHIL NFR BLD: 12.7 %
ERYTHROCYTE [DISTWIDTH] IN BLOOD BY AUTOMATED COUNT: 14.8 % (ref 11.5–14.5)
GLOBULIN SER CALC-MCNC: 2.8 G/DL (ref 2.3–3.5)
GLUCOSE SERPL-MCNC: 88 MG/DL (ref 70–99)
HCT VFR BLD AUTO: 44.6 % (ref 42–52)
HGB BLD-MCNC: 14.2 G/DL (ref 14–18)
LYMPHOCYTES # BLD: 2.6 K/UL (ref 1–4.8)
LYMPHOCYTES NFR BLD: 27.7 %
MCH RBC QN AUTO: 24.2 PG (ref 27–31.3)
MCHC RBC AUTO-ENTMCNC: 31.8 % (ref 33–37)
MCV RBC AUTO: 76.1 FL (ref 79–92.2)
MONOCYTES # BLD: 0.6 K/UL (ref 0.2–0.8)
MONOCYTES NFR BLD: 6.1 %
NEUTROPHILS # BLD: 5 K/UL (ref 1.4–6.5)
NEUTS SEG NFR BLD: 52.7 %
PLATELET # BLD AUTO: 299 K/UL (ref 130–400)
POTASSIUM SERPL-SCNC: 4.4 MEQ/L (ref 3.4–4.9)
PROT SERPL-MCNC: 7.1 G/DL (ref 6.3–8)
RBC # BLD AUTO: 5.86 M/UL (ref 4.7–6.1)
SODIUM SERPL-SCNC: 138 MEQ/L (ref 135–144)
WBC # BLD AUTO: 9.5 K/UL (ref 4.8–10.8)

## 2025-06-26 PROCEDURE — 10160 PNXR ASPIR ABSC HMTMA BULLA: CPT

## 2025-06-26 PROCEDURE — 80053 COMPREHEN METABOLIC PANEL: CPT

## 2025-06-26 PROCEDURE — 99283 EMERGENCY DEPT VISIT LOW MDM: CPT

## 2025-06-26 PROCEDURE — 36415 COLL VENOUS BLD VENIPUNCTURE: CPT

## 2025-06-26 PROCEDURE — 46083 INC THROMBOSED HROID XTRNL: CPT

## 2025-06-26 PROCEDURE — 85025 COMPLETE CBC W/AUTO DIFF WBC: CPT

## 2025-06-26 RX ORDER — CIPROFLOXACIN 500 MG/1
500 TABLET, FILM COATED ORAL 2 TIMES DAILY
Qty: 20 TABLET | Refills: 0 | Status: SHIPPED | OUTPATIENT
Start: 2025-06-26 | End: 2025-07-06

## 2025-06-26 RX ORDER — ETODOLAC 400 MG/1
400 TABLET, FILM COATED ORAL 2 TIMES DAILY
Qty: 14 TABLET | Refills: 0 | Status: SHIPPED | OUTPATIENT
Start: 2025-06-26

## 2025-06-26 RX ORDER — METRONIDAZOLE 500 MG/1
500 TABLET ORAL 3 TIMES DAILY
Qty: 30 TABLET | Refills: 0 | Status: SHIPPED | OUTPATIENT
Start: 2025-06-26 | End: 2025-07-06

## 2025-06-26 ASSESSMENT — PAIN DESCRIPTION - PAIN TYPE: TYPE: ACUTE PAIN

## 2025-06-26 ASSESSMENT — ENCOUNTER SYMPTOMS
APNEA: 0
EYE PAIN: 0
ABDOMINAL PAIN: 0
ALLERGIC/IMMUNOLOGIC NEGATIVE: 1
RECTAL PAIN: 1
SHORTNESS OF BREATH: 0
COLOR CHANGE: 0
TROUBLE SWALLOWING: 0

## 2025-06-26 ASSESSMENT — LIFESTYLE VARIABLES
HOW MANY STANDARD DRINKS CONTAINING ALCOHOL DO YOU HAVE ON A TYPICAL DAY: PATIENT DOES NOT DRINK
HOW OFTEN DO YOU HAVE A DRINK CONTAINING ALCOHOL: NEVER

## 2025-06-26 ASSESSMENT — PAIN DESCRIPTION - ONSET: ONSET: ON-GOING

## 2025-06-26 ASSESSMENT — PAIN SCALES - GENERAL: PAINLEVEL_OUTOF10: 8

## 2025-06-26 ASSESSMENT — PAIN DESCRIPTION - FREQUENCY: FREQUENCY: CONTINUOUS

## 2025-06-26 ASSESSMENT — PAIN - FUNCTIONAL ASSESSMENT: PAIN_FUNCTIONAL_ASSESSMENT: 0-10

## 2025-06-26 ASSESSMENT — PAIN DESCRIPTION - LOCATION: LOCATION: BUTTOCKS

## 2025-06-26 NOTE — ED PROVIDER NOTES
[facility] EMERGENCY DEPARTMENT  eMERGENCYdEPARTMENT eNCOUnter        Pt Name: Speedy Birmingham  MRN: [id number]  Birthdate 2000of evaluation: 6/26/2025  Provider:Silvestre Ruano PA-C  3:54 PM EDT    CHIEF COMPLAINT       Chief Complaint   Patient presents with    Abscess         HISTORY OF PRESENT ILLNESS  (Location/Symptom, Timing/Onset, Context/Setting, Quality, Duration, Modifying Factors, Severity.)   Speedy Birmingham is a 25 y.o. male who presents to the emergency department with swelling to the left rectal/perirectal region that was noticed yesterday.  Patient denies any fevers or chills.  No pain with defecation.  Patient states that he did soak in a warm tub yesterday and noticed the mild amount of drainage.  Patient denies any associated abdominal pain, testicle pain or dysuria    HPI    Nursing Notes were reviewed and I agree.    REVIEW OF SYSTEMS    (2-9 systems for level 4, 10 or more for level 5)     Review of Systems   Constitutional:  Negative for diaphoresis and fever.   HENT:  Negative for hearing loss and trouble swallowing.    Eyes:  Negative for pain.   Respiratory:  Negative for apnea and shortness of breath.    Cardiovascular:  Negative for chest pain.   Gastrointestinal:  Positive for rectal pain. Negative for abdominal pain.   Endocrine: Negative.    Genitourinary:  Negative for hematuria.   Musculoskeletal:  Negative for neck pain and neck stiffness.   Skin:  Negative for color change.   Allergic/Immunologic: Negative.    Neurological:  Negative for dizziness and numbness.   Hematological: Negative.    Psychiatric/Behavioral: Negative.     All other systems reviewed and are negative.       as noted above the remainder of the review of systems was reviewed and negative.       PAST MEDICAL HISTORY   History reviewed. No pertinent past medical history.      SURGICAL HISTORY     History reviewed. No pertinent surgical history.      CURRENT MEDICATIONS       Previous Medications    NAPROXEN  He is not in acute distress.     Appearance: He is not diaphoretic.   HENT:      Head: Normocephalic and atraumatic.      Mouth/Throat:      Mouth: Mucous membranes are moist.   Eyes:      Conjunctiva/sclera: Conjunctivae normal.   Cardiovascular:      Rate and Rhythm: Normal rate.   Pulmonary:      Effort: Pulmonary effort is normal.   Genitourinary:      Musculoskeletal:         General: Normal range of motion.   Skin:     General: Skin is warm and dry.   Neurological:      Mental Status: He is alert and oriented to person, place, and time.           DIAGNOSTIC RESULTS     RADIOLOGY:   Non-plain film images such as CT, Ultrasound and MRI are read by the radiologist. Plain radiographic images are visualized and preliminarilyinterpreted by Silvestre Ruano PA-C with the below findings:  Read By Myself:        Interpretation per the Radiologist below, if available at the time of this note:    No orders to display       LABS:  Labs Reviewed   CBC WITH AUTO DIFFERENTIAL - Abnormal; Notable for the following components:       Result Value    MCV 76.1 (*)     MCH 24.2 (*)     MCHC 31.8 (*)     RDW 14.8 (*)     Eosinophils Absolute 1.2 (*)     All other components within normal limits   COMPREHENSIVE METABOLIC PANEL W/ REFLEX TO MG FOR LOW K       All other labs were within normal range or not returnedas of this dictation.    EMERGENCYDEPARTMENT COURSE and DIFFERENTIAL DIAGNOSIS/MDM:   Vitals:    Vitals:    06/26/25 1358   BP: 104/81   Pulse: 61   Resp: 16   Temp: 98.2 °F (36.8 °C)   TempSrc: Oral   SpO2: 100%   Weight: 115.5 kg (254 lb 9.6 oz)   Height: 1.702 m (5' 7\")       REASSESSMENT        Patient presents emergency department with swollen area on the left perirectal region that was noticed yesterday.  Given the unclear etiology of inflamed hemorrhoid versus abscess, area was anesthetized with 2% lidocaine without epinephrine followed by 18-gauge needle aspiration with approximately 6 cc of bloody drainage.  Decrease

## 2025-06-26 NOTE — ED TRIAGE NOTES
Pt in with abscess on the inside of the left buttock. Pt states having abscesses before in his armpit area but has never have one in this location. Pt states noticing it yesterday and is increasing in pain.

## 2025-06-26 NOTE — CARE COORDINATION
With the patient's permission, this nurse made him an appointment to see Dr Velazco for 7/1/25 @ 1030. The patient states that this is acceptable to him and it was noted on his d/c instructions.

## 2025-06-26 NOTE — ED PROVIDER NOTES
Basic Information   Time Seen: 2:31 PM   Primary Care Provider: Dariela Swan DO     Chief Complaint   Patient presents with    Abscess      HPI   Speedy Birmingham is a 25 yrs male who presents with abscess to the inside of his left buttock since yesterday.  Patient states that he has a history of abscesses but this is the first 1 year his perineal region.  He denies fevers at home.  He states that he took a warm bath yesterday and did notice some red drainage.   Physical Exam     /81 (06/26/25 1358)    Temp 98.2 °F (36.8 °C) (06/26/25 1358)    Pulse 61 (06/26/25 1358)   Resp 16 (06/26/25 1358)    SpO2 100 % (06/26/25 1358)       General: Awake and Alert, no acute distress   CV: RRR, S1, S2   Resp: LCTAB, even and non labored   Other:   Impression and Plan     Labs Reviewed   CBC WITH AUTO DIFFERENTIAL   COMPREHENSIVE METABOLIC PANEL W/ REFLEX TO MG FOR LOW K        No orders to display      Final Impression   I have performed a medical screening exam on Speedy Birmingham. Based on this patient's chief complaint/symptoms of   Chief Complaint   Patient presents with    Abscess    and my focused exam, their care will be started and transitioned to provider when room is available        Travon Schneider PA-C  06/26/25 9530

## 2025-07-01 ENCOUNTER — OFFICE VISIT (OUTPATIENT)
Age: 25
End: 2025-07-01
Payer: MEDICAID

## 2025-07-01 VITALS
OXYGEN SATURATION: 98 % | BODY MASS INDEX: 39.87 KG/M2 | HEIGHT: 67 IN | TEMPERATURE: 97.3 F | HEART RATE: 83 BPM | WEIGHT: 254 LBS

## 2025-07-01 DIAGNOSIS — K64.5 THROMBOSED HEMORRHOIDS: Primary | ICD-10-CM

## 2025-07-01 PROCEDURE — 99203 OFFICE O/P NEW LOW 30 MIN: CPT | Performed by: COLON & RECTAL SURGERY

## 2025-07-01 PROCEDURE — 99202 OFFICE O/P NEW SF 15 MIN: CPT | Performed by: COLON & RECTAL SURGERY

## 2025-07-01 ASSESSMENT — ENCOUNTER SYMPTOMS
CHEST TIGHTNESS: 0
COLOR CHANGE: 0
DIARRHEA: 0
RECTAL PAIN: 1
ANAL BLEEDING: 0
CONSTIPATION: 0

## 2025-07-01 NOTE — PROGRESS NOTES
Subjective:      Patient ID: Speedy Birmingham is a 25 y.o. male who presents for:  Chief Complaint   Patient presents with    New Patient       This is a 25-year-old male who went to the emergency department for a abscess    He was given antibiotics and sent to me for surgical treatment    The patient's had a swollen area around his anal canal for about 1 week.  He has improved recently.  Past medical and surgical history reviewed and noncontributory.        No past medical history on file.  No past surgical history on file.  Social History     Socioeconomic History    Marital status: Single     Spouse name: Not on file    Number of children: Not on file    Years of education: Not on file    Highest education level: Not on file   Occupational History    Not on file   Tobacco Use    Smoking status: Former     Types: Cigars    Smokeless tobacco: Never   Vaping Use    Vaping status: Never Used   Substance and Sexual Activity    Alcohol use: No    Drug use: Yes     Types: Marijuana (Weed)     Comment: occassionally    Sexual activity: Not on file   Other Topics Concern    Not on file   Social History Narrative    Not on file     Social Drivers of Health     Financial Resource Strain: Low Risk  (11/30/2021)    Overall Financial Resource Strain (CARDIA)     Difficulty of Paying Living Expenses: Not hard at all   Food Insecurity: No Food Insecurity (11/30/2021)    Hunger Vital Sign     Worried About Running Out of Food in the Last Year: Never true     Ran Out of Food in the Last Year: Never true   Transportation Needs: No Transportation Needs (11/30/2021)    PRAPARE - Transportation     Lack of Transportation (Medical): No     Lack of Transportation (Non-Medical): No   Physical Activity: Not on file   Stress: Not on file   Social Connections: Not on file   Intimate Partner Violence: Not on file   Housing Stability: Not on file     Family History   Problem Relation Age of Onset    No Known Problems Mother     No Known

## 2025-07-10 ENCOUNTER — TELEPHONE (OUTPATIENT)
Age: 25
End: 2025-07-10

## 2025-07-11 ENCOUNTER — OFFICE VISIT (OUTPATIENT)
Age: 25
End: 2025-07-11
Payer: MEDICAID

## 2025-07-11 VITALS
OXYGEN SATURATION: 99 % | BODY MASS INDEX: 37.89 KG/M2 | RESPIRATION RATE: 16 BRPM | HEIGHT: 68 IN | HEART RATE: 82 BPM | DIASTOLIC BLOOD PRESSURE: 76 MMHG | WEIGHT: 250 LBS | SYSTOLIC BLOOD PRESSURE: 118 MMHG

## 2025-07-11 DIAGNOSIS — Z11.4 ENCOUNTER FOR SCREENING FOR HIV: ICD-10-CM

## 2025-07-11 DIAGNOSIS — Z23 NEED FOR PROPHYLACTIC VACCINATION WITH TETANUS-DIPHTHERIA (TD): ICD-10-CM

## 2025-07-11 DIAGNOSIS — Z13.220 SCREENING, LIPID: ICD-10-CM

## 2025-07-11 DIAGNOSIS — Z11.59 NEED FOR HEPATITIS C SCREENING TEST: ICD-10-CM

## 2025-07-11 DIAGNOSIS — Z13.6 SCREENING FOR CARDIOVASCULAR CONDITION: ICD-10-CM

## 2025-07-11 DIAGNOSIS — Z00.00 ENCOUNTER FOR WELL ADULT EXAM WITHOUT ABNORMAL FINDINGS: Primary | ICD-10-CM

## 2025-07-11 PROCEDURE — 99385 PREV VISIT NEW AGE 18-39: CPT | Performed by: PHYSICIAN ASSISTANT

## 2025-07-11 SDOH — ECONOMIC STABILITY: FOOD INSECURITY: WITHIN THE PAST 12 MONTHS, YOU WORRIED THAT YOUR FOOD WOULD RUN OUT BEFORE YOU GOT MONEY TO BUY MORE.: NEVER TRUE

## 2025-07-11 SDOH — ECONOMIC STABILITY: FOOD INSECURITY: WITHIN THE PAST 12 MONTHS, THE FOOD YOU BOUGHT JUST DIDN'T LAST AND YOU DIDN'T HAVE MONEY TO GET MORE.: NEVER TRUE

## 2025-07-11 ASSESSMENT — ENCOUNTER SYMPTOMS
VOMITING: 0
SHORTNESS OF BREATH: 0
SORE THROAT: 0
CHEST TIGHTNESS: 0
ABDOMINAL PAIN: 0
NAUSEA: 0
SINUS PRESSURE: 0
BACK PAIN: 0
DIARRHEA: 0
SINUS PAIN: 0
COUGH: 0

## 2025-07-11 ASSESSMENT — PATIENT HEALTH QUESTIONNAIRE - PHQ9
2. FEELING DOWN, DEPRESSED OR HOPELESS: NOT AT ALL
SUM OF ALL RESPONSES TO PHQ QUESTIONS 1-9: 0
1. LITTLE INTEREST OR PLEASURE IN DOING THINGS: NOT AT ALL
SUM OF ALL RESPONSES TO PHQ QUESTIONS 1-9: 0

## 2025-07-11 ASSESSMENT — VISUAL ACUITY: OU: 1

## 2025-07-11 NOTE — PATIENT INSTRUCTIONS
calories than regular ones. Eat fat-free treats in moderation, as you would other foods.  If your favorite foods are high in fat, salt, sugar, or calories, limit how often you eat them. Eat smaller servings, or look for healthy substitutes. Fill up on fruits, vegetables, and whole grains.  Eating at home  Use meat as a side dish instead of as the main part of your meal.  Try main dishes that use whole wheat pasta, brown rice, dried beans, or vegetables.  Find ways to cook with little or no fat, such as broiling, steaming, or grilling.  Use cooking spray instead of oil. If you use oil, use a monounsaturated oil, such as canola or olive oil.  Trim fat from meats before you cook them.  Drain off fat after you brown the meat or while you roast it.  Chill soups and stews after you cook them. Then skim the fat off the top after it hardens.  Eating out  Order foods that are broiled or poached rather than fried or breaded.  Cut back on the amount of butter or margarine that you use on bread.  Order sauces, gravies, and salad dressings on the side, and use only a little.  When you order pasta, choose tomato sauce rather than cream sauce.  Ask for salsa with your baked potato instead of sour cream, butter, cheese, or ruiz.  Order meals in a small size instead of upgrading to a large.  Share an entree, or take part of your food home to eat as another meal.  Share appetizers and desserts.  Where can you learn more?  Go to https://www.[employer].net/patientEd and enter V914 to learn more about \"Learning About Cutting Calories.\"  Current as of: October 7, 2024  Content Version: 14.5  © 7097-2017 [employer].   Care instructions adapted under license by [employer]. If you have questions about a medical condition or this instruction, always ask your healthcare professional. [employer] disclaims any warranty or liability for your use of this information.         Learning About Low-Carbohydrate Diets  What

## 2025-07-11 NOTE — PROGRESS NOTES
Well Adult Note  Name: Speedy Birmingham Today’s Date: 2025   MRN: [id number] Sex: Male   Age: 25 y.o. Ethnicity: Non- / Non    : 2000 Race: Black /       Speedy Birmingham is here for a well adult exam.  Chief Complaint   Patient presents with    New Patient     No concerns, generalized check up  Tdap script requested    Annual Exam          Subjective   History:  History of Present Illness  The patient presents for a checkup.    He has been without a primary care physician for some time, with his previous doctor having retired. He has not undergone any annual physical examinations. 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 He plans to enroll in school in 2025. He enjoys spending time with his son and is expecting a daughter at the end of this month. He reports smoking marijuana 2 to 3 times daily. He maintains an active lifestyle, including regular workouts at the gym, although he has been less consistent in recent months.    He was hospitalized last week due to an external hemorrhoid abscess, which was treated with Cipro and metronidazole. No surgical intervention was required as the thrombosed hemorrhoid resolved spontaneously. He reports no constipation or straining during bowel movements, which occur 2 to 3 times daily and are slightly loose. He occasionally consumes fiber supplements. He believes prolonged sitting on the toilet while using his phone may have contributed to his condition, a habit he has since reduced. He was advised to return for a follow-up visit if the hemorrhoid did not fully resolve within a week.    He had pneumonia in  when he had a blood clot.    SOCIAL HISTORY  He smokes marijuana 2 to 3 times a day. 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 He plans to enroll in school in 2025. He enjoys spending time with his son and is expecting a daughter at the end of this